# Patient Record
Sex: MALE | Race: BLACK OR AFRICAN AMERICAN | NOT HISPANIC OR LATINO | Employment: FULL TIME | RURAL
[De-identification: names, ages, dates, MRNs, and addresses within clinical notes are randomized per-mention and may not be internally consistent; named-entity substitution may affect disease eponyms.]

---
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
## 2022-06-02 ENCOUNTER — OFFICE VISIT (OUTPATIENT)
Dept: FAMILY MEDICINE | Facility: CLINIC | Age: 22
End: 2022-06-02

## 2022-06-02 VITALS
WEIGHT: 315 LBS | HEIGHT: 67 IN | DIASTOLIC BLOOD PRESSURE: 78 MMHG | BODY MASS INDEX: 49.44 KG/M2 | TEMPERATURE: 99 F | RESPIRATION RATE: 16 BRPM | SYSTOLIC BLOOD PRESSURE: 121 MMHG | OXYGEN SATURATION: 99 % | HEART RATE: 72 BPM

## 2022-06-02 DIAGNOSIS — R22.2 CHEST WALL MASS: Primary | ICD-10-CM

## 2022-06-02 DIAGNOSIS — L73.2 HIDRADENITIS SUPPURATIVA: ICD-10-CM

## 2022-06-02 LAB
ALBUMIN SERPL BCP-MCNC: 3.5 G/DL (ref 3.5–5)
ALBUMIN/GLOB SERPL: 0.9 {RATIO}
ALP SERPL-CCNC: 113 U/L (ref 45–115)
ALT SERPL W P-5'-P-CCNC: 29 U/L (ref 16–61)
ANION GAP SERPL CALCULATED.3IONS-SCNC: 10 MMOL/L (ref 7–16)
AST SERPL W P-5'-P-CCNC: 13 U/L (ref 15–37)
BASOPHILS # BLD AUTO: 0.06 K/UL (ref 0–0.2)
BASOPHILS NFR BLD AUTO: 0.4 % (ref 0–1)
BILIRUB SERPL-MCNC: 0.8 MG/DL (ref 0–1.2)
BUN SERPL-MCNC: 12 MG/DL (ref 7–18)
BUN/CREAT SERPL: 12 (ref 6–20)
CALCIUM SERPL-MCNC: 9.2 MG/DL (ref 8.5–10.1)
CHLORIDE SERPL-SCNC: 104 MMOL/L (ref 98–107)
CO2 SERPL-SCNC: 29 MMOL/L (ref 21–32)
CREAT SERPL-MCNC: 0.97 MG/DL (ref 0.7–1.3)
DIFFERENTIAL METHOD BLD: ABNORMAL
EOSINOPHIL # BLD AUTO: 0.14 K/UL (ref 0–0.5)
EOSINOPHIL NFR BLD AUTO: 1 % (ref 1–4)
ERYTHROCYTE [DISTWIDTH] IN BLOOD BY AUTOMATED COUNT: 11.9 % (ref 11.5–14.5)
GLOBULIN SER-MCNC: 4.1 G/DL (ref 2–4)
GLUCOSE SERPL-MCNC: 86 MG/DL (ref 74–106)
HCT VFR BLD AUTO: 44.1 % (ref 40–54)
HGB BLD-MCNC: 14.3 G/DL (ref 13.5–18)
IMM GRANULOCYTES # BLD AUTO: 0.05 K/UL (ref 0–0.04)
IMM GRANULOCYTES NFR BLD: 0.4 % (ref 0–0.4)
LYMPHOCYTES # BLD AUTO: 1.92 K/UL (ref 1–4.8)
LYMPHOCYTES NFR BLD AUTO: 13.9 % (ref 27–41)
MCH RBC QN AUTO: 30.3 PG (ref 27–31)
MCHC RBC AUTO-ENTMCNC: 32.4 G/DL (ref 32–36)
MCV RBC AUTO: 93.4 FL (ref 80–96)
MONOCYTES # BLD AUTO: 1.25 K/UL (ref 0–0.8)
MONOCYTES NFR BLD AUTO: 9.1 % (ref 2–6)
MPC BLD CALC-MCNC: 10.3 FL (ref 9.4–12.4)
NEUTROPHILS # BLD AUTO: 10.37 K/UL (ref 1.8–7.7)
NEUTROPHILS NFR BLD AUTO: 75.2 % (ref 53–65)
NRBC # BLD AUTO: 0 X10E3/UL
NRBC, AUTO (.00): 0 %
PLATELET # BLD AUTO: 341 K/UL (ref 150–400)
POTASSIUM SERPL-SCNC: 4.6 MMOL/L (ref 3.5–5.1)
PROT SERPL-MCNC: 7.6 G/DL (ref 6.4–8.2)
RBC # BLD AUTO: 4.72 M/UL (ref 4.6–6.2)
SODIUM SERPL-SCNC: 138 MMOL/L (ref 136–145)
WBC # BLD AUTO: 13.79 K/UL (ref 4.5–11)

## 2022-06-02 PROCEDURE — 85025 COMPLETE CBC W/AUTO DIFF WBC: CPT | Mod: ,,, | Performed by: CLINICAL MEDICAL LABORATORY

## 2022-06-02 PROCEDURE — 80053 COMPREHENSIVE METABOLIC PANEL: ICD-10-PCS | Mod: ,,, | Performed by: CLINICAL MEDICAL LABORATORY

## 2022-06-02 PROCEDURE — 99214 OFFICE O/P EST MOD 30 MIN: CPT | Mod: ,,, | Performed by: FAMILY MEDICINE

## 2022-06-02 PROCEDURE — 99214 PR OFFICE/OUTPT VISIT, EST, LEVL IV, 30-39 MIN: ICD-10-PCS | Mod: ,,, | Performed by: FAMILY MEDICINE

## 2022-06-02 PROCEDURE — 80053 COMPREHEN METABOLIC PANEL: CPT | Mod: ,,, | Performed by: CLINICAL MEDICAL LABORATORY

## 2022-06-02 PROCEDURE — 85025 CBC WITH DIFFERENTIAL: ICD-10-PCS | Mod: ,,, | Performed by: CLINICAL MEDICAL LABORATORY

## 2022-06-02 RX ORDER — DOXYCYCLINE 100 MG/1
100 CAPSULE ORAL 2 TIMES DAILY
Qty: 14 CAPSULE | Refills: 0 | Status: SHIPPED | OUTPATIENT
Start: 2022-06-02 | End: 2022-06-09

## 2022-06-02 NOTE — PROGRESS NOTES
"   Antoni Victor DO   [facility]  [address]  [place]     PATIENT NAME: Cally Bearden  : 2000  DATE: 22  MRN: [id number]      Billing Provider: Antoni Victor DO  Level of Service:   Patient PCP Information     Provider PCP Type    Antoni Victor DO General          Reason for Visit / Chief Complaint: Abscess (Right later chest- no open sore/Left Axillary-open sore with drainage-pt says he isnt sure how long they have been there." a while")       Update PCP  Update Chief Complaint         History of Present Illness / Problem Focused Workflow     Cally Bearden presents to the clinic for abscess formation under BL axilla that has been present for about 2 week and become painful in the last week. Also reports abscess formation on RIGHT chest wall that is painful. No other complaints. No past medical. Past surgical of BL knee scope. NKDA. Denies tobacco, etoh or drug use      Review of Systems     Review of Systems   Constitutional: Negative.    Eyes: Negative.    Respiratory: Negative.    Cardiovascular: Negative.    Gastrointestinal: Negative.    All other systems reviewed and are negative.       Medical / Social / Family History   History reviewed. No pertinent past medical history.    Past Surgical History:   Procedure Laterality Date    bilateral knee surgery      TONSILLECTOMY         Social History  MrChana  reports that he has never smoked. He has never used smokeless tobacco. He reports that he does not drink alcohol and does not use drugs.    Family History  MrChana's family history is not on file.    Medications and Allergies     Medications  No outpatient medications have been marked as taking for the 22 encounter (Office Visit) with Antoni Victor DO.       Allergies  Review of patient's allergies indicates:   Allergen Reactions    Shellfish containing products Swelling       Physical Examination     Vitals:    22 1006   BP: 121/78   BP Location: " "Right arm   Patient Position: Sitting   Pulse: 72   Resp: 16   Temp: 98.6 °F (37 °C)   TempSrc: Oral   SpO2: 99%   Weight: (!) 152.7 kg (336 lb 9.6 oz)   Height: 5' 7" (1.702 m)      Physical Exam  Vitals and nursing note reviewed.   Constitutional:       General: He is not in acute distress.     Appearance: Normal appearance. He is normal weight. He is not ill-appearing, toxic-appearing or diaphoretic.   Neck:      Vascular: No carotid bruit.   Cardiovascular:      Rate and Rhythm: Normal rate and regular rhythm.      Pulses: Normal pulses.      Heart sounds: Normal heart sounds.   Pulmonary:      Effort: Pulmonary effort is normal.      Breath sounds: Normal breath sounds.   Lymphadenopathy:      Cervical: No cervical adenopathy.   Skin:     Comments: hidranitis supprativa BL axilla LEFT worse than RIGHT. LEFT side has open areas that appear to be tunneling. Scant dsicahrge. RIGHT is tender without areas of fluctuance or drainage.     4cm tender elongated mass on RIGHT chest wall. No obvious drainage, cellulitis or wound. Suspect lipoma   Neurological:      Mental Status: He is alert.          Assessment and Plan (including Health Maintenance)      Problem List  Smart Sets  Document Outside HM   :    Plan:   Refer to surgery  US chest wall mass  CBC  CMP  Pt will use OTC tylenol/ ibuprofen. Declined narcotic pain medication.   doxycyline      Health Maintenance Due   Topic Date Due    Hepatitis C Screening  Never done    HPV Vaccines (1 - Male 2-dose series) Never done    TETANUS VACCINE  Never done       Problem List Items Addressed This Visit    None       There are no diagnoses linked to this encounter.   Health Maintenance Topics with due status: Not Due       Topic Last Completion Date    Influenza Vaccine Not Due       Procedures     No future appointments.     No follow-ups on file.       Signature:  Antoni Victor DO    Date of encounter: 6/2/22    Education Documentation  No documentation " found.  Education Comments  No comments found.       There are no Patient Instructions on file for this visit.

## 2022-06-08 ENCOUNTER — HOSPITAL ENCOUNTER (EMERGENCY)
Facility: HOSPITAL | Age: 22
Discharge: LEFT BEFORE DISCHARGE | End: 2022-06-08

## 2022-06-08 VITALS
RESPIRATION RATE: 18 BRPM | WEIGHT: 315 LBS | BODY MASS INDEX: 46.65 KG/M2 | OXYGEN SATURATION: 98 % | DIASTOLIC BLOOD PRESSURE: 67 MMHG | TEMPERATURE: 98 F | HEART RATE: 88 BPM | SYSTOLIC BLOOD PRESSURE: 132 MMHG | HEIGHT: 69 IN

## 2022-06-08 PROCEDURE — 99900041 HC LEFT WITHOUT BEING SEEN- EMERGENCY

## 2022-10-25 ENCOUNTER — OFFICE VISIT (OUTPATIENT)
Dept: FAMILY MEDICINE | Facility: CLINIC | Age: 22
End: 2022-10-25

## 2022-10-25 VITALS
HEIGHT: 69 IN | TEMPERATURE: 99 F | SYSTOLIC BLOOD PRESSURE: 128 MMHG | OXYGEN SATURATION: 99 % | BODY MASS INDEX: 46.65 KG/M2 | HEART RATE: 67 BPM | WEIGHT: 315 LBS | RESPIRATION RATE: 18 BRPM | DIASTOLIC BLOOD PRESSURE: 74 MMHG

## 2022-10-25 DIAGNOSIS — L02.91 ABSCESS: Primary | ICD-10-CM

## 2022-10-25 LAB
BASOPHILS # BLD AUTO: 0.05 K/UL (ref 0–0.2)
BASOPHILS NFR BLD AUTO: 0.3 % (ref 0–1)
DIFFERENTIAL METHOD BLD: ABNORMAL
EOSINOPHIL # BLD AUTO: 0.15 K/UL (ref 0–0.5)
EOSINOPHIL NFR BLD AUTO: 1 % (ref 1–4)
ERYTHROCYTE [DISTWIDTH] IN BLOOD BY AUTOMATED COUNT: 12.6 % (ref 11.5–14.5)
HCT VFR BLD AUTO: 40.2 % (ref 40–54)
HGB BLD-MCNC: 12.7 G/DL (ref 13.5–18)
IMM GRANULOCYTES # BLD AUTO: 0.06 K/UL (ref 0–0.04)
IMM GRANULOCYTES NFR BLD: 0.4 % (ref 0–0.4)
LYMPHOCYTES # BLD AUTO: 2.09 K/UL (ref 1–4.8)
LYMPHOCYTES NFR BLD AUTO: 13.6 % (ref 27–41)
MCH RBC QN AUTO: 28.7 PG (ref 27–31)
MCHC RBC AUTO-ENTMCNC: 31.6 G/DL (ref 32–36)
MCV RBC AUTO: 90.7 FL (ref 80–96)
MONOCYTES # BLD AUTO: 1.24 K/UL (ref 0–0.8)
MONOCYTES NFR BLD AUTO: 8.1 % (ref 2–6)
MPC BLD CALC-MCNC: 10.2 FL (ref 9.4–12.4)
NEUTROPHILS # BLD AUTO: 11.77 K/UL (ref 1.8–7.7)
NEUTROPHILS NFR BLD AUTO: 76.6 % (ref 53–65)
NRBC # BLD AUTO: 0 X10E3/UL
NRBC, AUTO (.00): 0 %
PLATELET # BLD AUTO: 419 K/UL (ref 150–400)
RBC # BLD AUTO: 4.43 M/UL (ref 4.6–6.2)
WBC # BLD AUTO: 15.36 K/UL (ref 4.5–11)

## 2022-10-25 PROCEDURE — 87070 CULTURE OTHR SPECIMN AEROBIC: CPT | Mod: ,,, | Performed by: CLINICAL MEDICAL LABORATORY

## 2022-10-25 PROCEDURE — 87186 CULTURE, WOUND: ICD-10-PCS | Mod: XU,91,, | Performed by: CLINICAL MEDICAL LABORATORY

## 2022-10-25 PROCEDURE — 96372 THER/PROPH/DIAG INJ SC/IM: CPT | Mod: ,,, | Performed by: NURSE PRACTITIONER

## 2022-10-25 PROCEDURE — 99214 PR OFFICE/OUTPT VISIT, EST, LEVL IV, 30-39 MIN: ICD-10-PCS | Mod: 25,,, | Performed by: NURSE PRACTITIONER

## 2022-10-25 PROCEDURE — 85025 CBC WITH DIFFERENTIAL: ICD-10-PCS | Mod: ,,, | Performed by: CLINICAL MEDICAL LABORATORY

## 2022-10-25 PROCEDURE — 99214 OFFICE O/P EST MOD 30 MIN: CPT | Mod: 25,,, | Performed by: NURSE PRACTITIONER

## 2022-10-25 PROCEDURE — 85025 COMPLETE CBC W/AUTO DIFF WBC: CPT | Mod: ,,, | Performed by: CLINICAL MEDICAL LABORATORY

## 2022-10-25 PROCEDURE — 87070 CULTURE, WOUND: ICD-10-PCS | Mod: ,,, | Performed by: CLINICAL MEDICAL LABORATORY

## 2022-10-25 PROCEDURE — 87077 CULTURE AEROBIC IDENTIFY: CPT | Mod: ,,, | Performed by: CLINICAL MEDICAL LABORATORY

## 2022-10-25 PROCEDURE — 87077 CULTURE, WOUND: ICD-10-PCS | Mod: ,,, | Performed by: CLINICAL MEDICAL LABORATORY

## 2022-10-25 PROCEDURE — 96372 PR INJECTION,THERAP/PROPH/DIAG2ST, IM OR SUBCUT: ICD-10-PCS | Mod: ,,, | Performed by: NURSE PRACTITIONER

## 2022-10-25 PROCEDURE — 87186 SC STD MICRODIL/AGAR DIL: CPT | Mod: ,,, | Performed by: CLINICAL MEDICAL LABORATORY

## 2022-10-25 RX ORDER — CEFTRIAXONE 1 G/1
1 INJECTION, POWDER, FOR SOLUTION INTRAMUSCULAR; INTRAVENOUS
Status: COMPLETED | OUTPATIENT
Start: 2022-10-25 | End: 2022-10-25

## 2022-10-25 RX ORDER — MUPIROCIN 20 MG/G
OINTMENT TOPICAL 2 TIMES DAILY
Qty: 30 G | Refills: 3 | Status: SHIPPED | OUTPATIENT
Start: 2022-10-25 | End: 2022-11-07 | Stop reason: SDUPTHER

## 2022-10-25 RX ORDER — CLINDAMYCIN HYDROCHLORIDE 150 MG/1
150 CAPSULE ORAL EVERY 6 HOURS
Qty: 40 CAPSULE | Refills: 0 | Status: SHIPPED | OUTPATIENT
Start: 2022-10-25 | End: 2022-11-28 | Stop reason: ALTCHOICE

## 2022-10-25 RX ADMIN — CEFTRIAXONE 1 G: 1 INJECTION, POWDER, FOR SOLUTION INTRAMUSCULAR; INTRAVENOUS at 09:10

## 2022-10-25 NOTE — LETTER
October 25, 2022      [facility] [address] [place] [address]  Phone: [phone]  Fax: [phone]       Patient: Cally Bearden   YOB: 2000  Date of Visit: 10/25/2022    To Whom It May Concern:    Katelin Bearden  was at [facility] on 10/25/2022 accompanied by mother, Elisa Agee. The patient may return to work/school on 10/26/22 with no restrictions. If you have any questions or concerns, or if I can be of further assistance, please do not hesitate to contact me.    Sincerely,    SHYLA Persaud RN

## 2022-10-25 NOTE — PATIENT INSTRUCTIONS
Clean areas with soap and water, meds as ordered, keep covered with sterile dressings, return to clinic thur and Friday for repeat rocephin, eval on Friday, Monday will have kathy, keep all scheduled kathy.       He was informed that he may have upset stomach with antibiotics, try to tolerate if possible, take with food

## 2022-10-25 NOTE — PROGRESS NOTES
Diana Brownlee NP   [facility]  [address]  [place]     PATIENT NAME: Cally Bearden  : 2000  DATE: 10/25/22  MRN: [id number]      Billing Provider: Diana Brownlee NP  Level of Service:   Patient PCP Information       Provider PCP Type    Diana Brownlee NP General            Reason for Visit / Chief Complaint: Abscess (UNDER BILATERAL ARMS. This has been ongoing since at least . Currently has no insurance. [employer] BCBS will be active in January. )       Update PCP  Update Chief Complaint         History of Present Illness / Problem Focused Workflow     Cally Bearden presents to the clinic here with multiple abscess, draining under arms on upper trunk, lat area. Stated that has been going on since 2022      Review of Systems     Review of Systems   Constitutional:  Negative for chills, fatigue and fever.   HENT:  Negative for nasal congestion, ear pain, facial swelling, hearing loss, mouth dryness, mouth sores, postnasal drip, rhinorrhea, sinus pressure/congestion and goiter.    Eyes:  Negative for discharge and itching.   Respiratory:  Negative for cough, shortness of breath and wheezing.    Cardiovascular:  Negative for chest pain and leg swelling.   Gastrointestinal:  Negative for abdominal pain, change in bowel habit and change in bowel habit.   Genitourinary:  Negative for difficulty urinating, dysuria, enuresis, frequency, hematuria and urgency.   Integumentary:         Multiple draining abscesses under fariba arms and lat upper trunk. Open areas on left upper arm kathy 3 cm in diameter, dressing were intact, sm amt bright red blood on areas   Neurological:  Negative for dizziness, vertigo, syncope, weakness and headaches.   Psychiatric/Behavioral:  Negative for decreased concentration.    All other systems reviewed and are negative.     Medical / Social / Family History   No past medical history on file.    Past Surgical History:   Procedure Laterality Date    bilateral knee  "surgery      TONSILLECTOMY         Social History    reports that he has never smoked. He has never used smokeless tobacco. He reports that he does not drink alcohol and does not use drugs.    Family History  's family history is not on file.    Medications and Allergies     Medications  No outpatient medications have been marked as taking for the 10/25/22 encounter (Office Visit) with Diana Brownlee NP.     Current Facility-Administered Medications for the 10/25/22 encounter (Office Visit) with Diana Brownlee NP   Medication Dose Route Frequency Provider Last Rate Last Admin    cefTRIAXone injection 1 g  1 g Intramuscular 1 time in Clinic/HOD Diana Brownlee NP           Allergies  Review of patient's allergies indicates:   Allergen Reactions    Shellfish containing products Swelling       Physical Examination     Vitals:    10/25/22 0820   BP: 128/74   BP Location: Left arm   Patient Position: Sitting   Pulse: 67   Resp: 18   Temp: 99 °F (37.2 °C)   TempSrc: Oral   SpO2: 99%   Weight: (!) 146.7 kg (323 lb 6.4 oz)   Height: 5' 9" (1.753 m)      Physical Exam  Constitutional:       Appearance: Normal appearance. He is obese.   HENT:      Head: Normocephalic.      Right Ear: Tympanic membrane, ear canal and external ear normal.      Left Ear: Tympanic membrane, ear canal and external ear normal.      Nose: Nose normal.      Mouth/Throat:      Mouth: Mucous membranes are moist.      Pharynx: Oropharynx is clear.   Eyes:      Extraocular Movements: Extraocular movements intact.      Conjunctiva/sclera: Conjunctivae normal.      Pupils: Pupils are equal, round, and reactive to light.   Cardiovascular:      Rate and Rhythm: Normal rate and regular rhythm.      Pulses: Normal pulses.      Heart sounds: Normal heart sounds.   Pulmonary:      Effort: Pulmonary effort is normal.      Breath sounds: Normal breath sounds.   Abdominal:      General: Bowel sounds are normal.      Palpations: Abdomen is soft. "   Musculoskeletal:         General: Normal range of motion.   Skin:     General: Skin is warm and dry.      Capillary Refill: Capillary refill takes less than 2 seconds.      Comments: Multiple draining abscesses under fariba arms and lat upper trunk. Open areas on left upper arm kathy 3 cm in diameter, dressing were intact, sm amt bright red blood on areas,  Culture obtained and sent to path  Areas cleaned with betadine/saline, polysporin and telfa with 4x4 dressings applied   Neurological:      General: No focal deficit present.      Mental Status: He is alert and oriented to person, place, and time.   Psychiatric:         Mood and Affect: Mood normal.         Behavior: Behavior normal.        Assessment and Plan (including Health Maintenance)      Problem List  Smart Sets  Document Outside HM   :    Plan: Clean areas with soap and water, meds as ordered, keep covered with sterile dressings, return to clinic thur and Friday for repeat rocephin, eval on Friday, Monday will have kathy, keep all scheduled kathy    Abscess  -     Culture, Wound; Future; Expected date: 10/25/2022  -     CBC Auto Differential; Future; Expected date: 10/25/2022    Other orders  -     cefTRIAXone injection 1 g  -     clindamycin (CLEOCIN) 150 MG capsule; Take 1 capsule (150 mg total) by mouth every 6 (six) hours.  Dispense: 40 capsule; Refill: 0  -     mupirocin (BACTROBAN) 2 % ointment; Apply topically 2 (two) times daily.  Dispense: 30 g; Refill: 3            Health Maintenance Due   Topic Date Due    Hepatitis C Screening  Never done    COVID-19 Vaccine (1) Never done    HPV Vaccines (1 - Male 2-dose series) Never done    TETANUS VACCINE  Never done    Influenza Vaccine (1) Never done       Problem List Items Addressed This Visit    None  Visit Diagnoses       Abscess    -  Primary    Relevant Orders    Culture, Wound    CBC Auto Differential              The patient has no Health Maintenance topics of status Not Due    Procedures     No  future appointments.     Follow up for thur and friday for rocephin 1 gm im and then monday for eval .       Signature:  Diana Brownlee NP    Date of encounter: 10/25/22

## 2022-10-27 ENCOUNTER — OFFICE VISIT (OUTPATIENT)
Dept: SURGERY | Facility: CLINIC | Age: 22
End: 2022-10-27
Attending: SURGERY

## 2022-10-27 ENCOUNTER — OFFICE VISIT (OUTPATIENT)
Dept: FAMILY MEDICINE | Facility: CLINIC | Age: 22
End: 2022-10-27

## 2022-10-27 VITALS
HEART RATE: 88 BPM | HEIGHT: 69 IN | RESPIRATION RATE: 18 BRPM | WEIGHT: 315 LBS | DIASTOLIC BLOOD PRESSURE: 68 MMHG | BODY MASS INDEX: 46.65 KG/M2 | SYSTOLIC BLOOD PRESSURE: 138 MMHG | TEMPERATURE: 99 F | OXYGEN SATURATION: 99 %

## 2022-10-27 VITALS — DIASTOLIC BLOOD PRESSURE: 84 MMHG | BODY MASS INDEX: 47.76 KG/M2 | HEIGHT: 69 IN | SYSTOLIC BLOOD PRESSURE: 161 MMHG

## 2022-10-27 DIAGNOSIS — L73.2 HIDRADENITIS SUPPURATIVA: ICD-10-CM

## 2022-10-27 DIAGNOSIS — L02.91 ABSCESS: Primary | ICD-10-CM

## 2022-10-27 LAB
BASOPHILS # BLD AUTO: 0.02 K/UL (ref 0–0.2)
BASOPHILS NFR BLD AUTO: 0.2 % (ref 0–1)
DIFFERENTIAL METHOD BLD: ABNORMAL
EOSINOPHIL # BLD AUTO: 0.1 K/UL (ref 0–0.5)
EOSINOPHIL NFR BLD AUTO: 0.9 % (ref 1–4)
ERYTHROCYTE [DISTWIDTH] IN BLOOD BY AUTOMATED COUNT: 12.8 % (ref 11.5–14.5)
HCT VFR BLD AUTO: 38.9 % (ref 40–54)
HGB BLD-MCNC: 12.4 G/DL (ref 13.5–18)
LYMPHOCYTES # BLD AUTO: 1.39 K/UL (ref 1–4.8)
LYMPHOCYTES NFR BLD AUTO: 12 % (ref 27–41)
MCH RBC QN AUTO: 28.9 PG (ref 27–31)
MCHC RBC AUTO-ENTMCNC: 31.9 G/DL (ref 32–36)
MCV RBC AUTO: 90.7 FL (ref 80–96)
MONOCYTES # BLD AUTO: 0.38 K/UL (ref 0–0.8)
MONOCYTES NFR BLD AUTO: 3.3 % (ref 2–6)
MPC BLD CALC-MCNC: 9.5 FL (ref 9.4–12.4)
NEUTROPHILS # BLD AUTO: 9.67 K/UL (ref 1.8–7.7)
NEUTROPHILS NFR BLD AUTO: 83.6 % (ref 53–65)
PLATELET # BLD AUTO: 374 K/UL (ref 150–400)
RBC # BLD AUTO: 4.29 M/UL (ref 4.6–6.2)
WBC # BLD AUTO: 11.56 K/UL (ref 4.5–11)

## 2022-10-27 PROCEDURE — 85025 COMPLETE CBC W/AUTO DIFF WBC: CPT | Performed by: NURSE PRACTITIONER

## 2022-10-27 PROCEDURE — 99213 OFFICE O/P EST LOW 20 MIN: CPT | Mod: 25,,, | Performed by: NURSE PRACTITIONER

## 2022-10-27 PROCEDURE — 96372 THER/PROPH/DIAG INJ SC/IM: CPT | Mod: ,,, | Performed by: NURSE PRACTITIONER

## 2022-10-27 PROCEDURE — 96372 PR INJECTION,THERAP/PROPH/DIAG2ST, IM OR SUBCUT: ICD-10-PCS | Mod: ,,, | Performed by: NURSE PRACTITIONER

## 2022-10-27 PROCEDURE — 99214 OFFICE O/P EST MOD 30 MIN: CPT | Mod: PBBFAC | Performed by: SURGERY

## 2022-10-27 PROCEDURE — 99203 PR OFFICE/OUTPT VISIT, NEW, LEVL III, 30-44 MIN: ICD-10-PCS | Mod: S$PBB,,, | Performed by: SURGERY

## 2022-10-27 PROCEDURE — 99213 PR OFFICE/OUTPT VISIT, EST, LEVL III, 20-29 MIN: ICD-10-PCS | Mod: 25,,, | Performed by: NURSE PRACTITIONER

## 2022-10-27 PROCEDURE — 99203 OFFICE O/P NEW LOW 30 MIN: CPT | Mod: S$PBB,,, | Performed by: SURGERY

## 2022-10-27 RX ORDER — SULFAMETHOXAZOLE AND TRIMETHOPRIM 800; 160 MG/1; MG/1
1 TABLET ORAL 2 TIMES DAILY
Qty: 20 TABLET | Refills: 0 | Status: SHIPPED | OUTPATIENT
Start: 2022-10-27 | End: 2022-11-23

## 2022-10-27 RX ORDER — CEFTRIAXONE 1 G/1
1 INJECTION, POWDER, FOR SOLUTION INTRAMUSCULAR; INTRAVENOUS
Status: COMPLETED | OUTPATIENT
Start: 2022-10-27 | End: 2022-10-27

## 2022-10-27 RX ADMIN — CEFTRIAXONE 1 G: 1 INJECTION, POWDER, FOR SOLUTION INTRAMUSCULAR; INTRAVENOUS at 08:10

## 2022-10-27 NOTE — PROGRESS NOTES
Diana Brownlee NP   [facility]  [address]  [place]     PATIENT NAME: Cally Bearden  : 2000  DATE: 10/27/22  MRN: [id number]      Billing Provider: Diana Brownlee NP  Level of Service:   Patient PCP Information       Provider PCP Type    Diana Brownlee NP General            Reason for Visit / Chief Complaint: Follow-up (Hidradenitis suppurativa- bilateral axillary )       Update PCP  Update Chief Complaint         History of Present Illness / Problem Focused Workflow     Cally Bearden presents to the clinic here for f/u hidradenitis suppurativa, fariba axillary region and upper trunk, areas are much improved, cbc improved, will refer to surgeon for eval.       Review of Systems     Review of Systems   Constitutional:  Negative for chills, fatigue and fever.   HENT:  Negative for nasal congestion, ear pain, facial swelling, hearing loss, mouth dryness, mouth sores, postnasal drip, rhinorrhea, sinus pressure/congestion and goiter.    Eyes:  Negative for discharge and itching.   Respiratory:  Negative for cough, shortness of breath and wheezing.    Cardiovascular:  Negative for chest pain and leg swelling.   Gastrointestinal:  Negative for abdominal pain, change in bowel habit and change in bowel habit.   Genitourinary:  Negative for difficulty urinating, dysuria, enuresis, frequency, hematuria and urgency.   Integumentary:         Multiple abscesses under fariba arms and upper trunk   Neurological:  Negative for dizziness, vertigo, syncope, weakness and headaches.   Psychiatric/Behavioral:  Negative for decreased concentration.       Medical / Social / Family History   No past medical history on file.    Past Surgical History:   Procedure Laterality Date    bilateral knee surgery      TONSILLECTOMY         Social History    reports that he has never smoked. He has never used smokeless tobacco. He reports that he does not drink alcohol and does not use drugs.    Family History  Mr.'s family  "history is not on file.    Medications and Allergies     Medications  No outpatient medications have been marked as taking for the 10/27/22 encounter (Office Visit) with Diana Brownlee NP.     Current Facility-Administered Medications for the 10/27/22 encounter (Office Visit) with Diana Brownlee NP   Medication Dose Route Frequency Provider Last Rate Last Admin    [COMPLETED] cefTRIAXone injection 1 g  1 g Intramuscular 1 time in Clinic/HOD Diana Brownlee NP   1 g at 10/27/22 0852       Allergies  Review of patient's allergies indicates:   Allergen Reactions    Shellfish containing products Swelling       Physical Examination     Vitals:    10/27/22 0845   BP: 138/68   BP Location: Left arm   Patient Position: Sitting   Pulse: 88   Resp: 18   Temp: 99.4 °F (37.4 °C)   TempSrc: Oral   SpO2: 99%   Weight: (!) 146.7 kg (323 lb 6.6 oz)   Height: 5' 9" (1.753 m)      Physical Exam  Vitals and nursing note reviewed.   Constitutional:       Appearance: Normal appearance.   HENT:      Head: Normocephalic.      Right Ear: Tympanic membrane, ear canal and external ear normal.      Left Ear: Tympanic membrane, ear canal and external ear normal.      Nose: Nose normal.      Mouth/Throat:      Mouth: Mucous membranes are moist.      Pharynx: Oropharynx is clear.   Eyes:      Extraocular Movements: Extraocular movements intact.      Conjunctiva/sclera: Conjunctivae normal.      Pupils: Pupils are equal, round, and reactive to light.   Cardiovascular:      Rate and Rhythm: Normal rate and regular rhythm.      Pulses: Normal pulses.      Heart sounds: Normal heart sounds.   Pulmonary:      Effort: Pulmonary effort is normal.      Breath sounds: Normal breath sounds.   Abdominal:      General: Bowel sounds are normal.      Palpations: Abdomen is soft.   Musculoskeletal:         General: Normal range of motion.      Cervical back: Normal range of motion and neck supple.   Skin:     General: Skin is warm and dry.      Capillary " Refill: Capillary refill takes less than 2 seconds.      Comments: Multiple abscesses fariba axillary and right upper arm and trunk. Less drainage today, much improvement noted   Neurological:      General: No focal deficit present.      Mental Status: He is alert and oriented to person, place, and time.   Psychiatric:         Mood and Affect: Mood normal.         Behavior: Behavior normal.        Assessment and Plan (including Health Maintenance)      Problem List  Smart Sets  Document Outside HM   :    Plan: to see dr Tobar at 300pm today, will change antibiotic to bactrim, stop clinidamycin if ok with dr tobar. Return to clinic tomorrow for eval and then Monday as scheduled unless dr tobar has different plan of care    Abscess  -     CBC Auto Differential; Future; Expected date: 10/27/2022  -     cefTRIAXone injection 1 g    Hidradenitis suppurativa  -     Ambulatory referral/consult to General Surgery; Future; Expected date: 11/03/2022    Other orders  -     sulfamethoxazole-trimethoprim 800-160mg (BACTRIM DS) 800-160 mg Tab; Take 1 tablet by mouth 2 (two) times daily. (Patient not taking: Reported on 10/27/2022)  Dispense: 20 tablet; Refill: 0          Health Maintenance Due   Topic Date Due    Hepatitis C Screening  Never done    COVID-19 Vaccine (1) Never done    HPV Vaccines (1 - Male 2-dose series) Never done    TETANUS VACCINE  Never done    Influenza Vaccine (1) Never done       Problem List Items Addressed This Visit          Derm    Hidradenitis suppurativa    Relevant Orders    Ambulatory referral/consult to General Surgery       ID    Abscess - Primary    Relevant Medications    cefTRIAXone injection 1 g (Completed)    Other Relevant Orders    CBC Auto Differential (Completed)         The patient has no Health Maintenance topics of status Not Due    Procedures     Future Appointments   Date Time Provider Department Center   10/31/2022  2:45 PM Diana Brownlee NP [facility]        No follow-ups on  file.       Signature:  Diana Brownlee NP    Date of encounter: 10/27/22

## 2022-10-27 NOTE — PROGRESS NOTES
Do not send in doxycycline, will send in bactrim ds and give anoterh rocephin today, check cbc today

## 2022-10-28 ENCOUNTER — CLINICAL SUPPORT (OUTPATIENT)
Dept: FAMILY MEDICINE | Facility: CLINIC | Age: 22
End: 2022-10-28

## 2022-10-28 DIAGNOSIS — L02.91 ABSCESS: Primary | ICD-10-CM

## 2022-10-28 DIAGNOSIS — L73.2 HIDRADENITIS SUPPURATIVA: ICD-10-CM

## 2022-10-28 LAB
MICROORGANISM SPEC CULT: ABNORMAL
MICROORGANISM SPEC CULT: ABNORMAL

## 2022-10-28 PROCEDURE — 96372 THER/PROPH/DIAG INJ SC/IM: CPT | Mod: ,,, | Performed by: NURSE PRACTITIONER

## 2022-10-28 PROCEDURE — 96372 PR INJECTION,THERAP/PROPH/DIAG2ST, IM OR SUBCUT: ICD-10-PCS | Mod: ,,, | Performed by: NURSE PRACTITIONER

## 2022-10-28 RX ORDER — CEFTRIAXONE 1 G/1
1 INJECTION, POWDER, FOR SOLUTION INTRAMUSCULAR; INTRAVENOUS
Status: COMPLETED | OUTPATIENT
Start: 2022-10-28 | End: 2022-10-28

## 2022-10-28 RX ADMIN — CEFTRIAXONE 1 G: 1 INJECTION, POWDER, FOR SOLUTION INTRAMUSCULAR; INTRAVENOUS at 10:10

## 2022-10-28 NOTE — PROGRESS NOTES
General Surgery History and Physical      Patient ID: Cally Bearden is a 21 y.o. male.    Chief Complaint: Other (Abcess under both arms since June of 2022)      HPI:  21-year-old male who for last couple of years has had bilateral axillary hidradenitis right side greater than left.  He has had multiple draining sinuses and tenderness.  No recent fever or chills.  His most recent flare-up has caused him to go was primary care doctor and he has not really done so well with antibiotics still with some soreness and drainage.  He patient is nondiabetic.  He has had these areas drain before and currently there draining some mild purulent areas.    Review of Systems   Constitutional:  Positive for chills and fatigue. Negative for activity change, appetite change and fever.   HENT:  Negative for trouble swallowing.    Respiratory:  Negative for cough and shortness of breath.    Cardiovascular:  Negative for chest pain and palpitations.   Gastrointestinal:  Negative for abdominal distention, abdominal pain, blood in stool, constipation and diarrhea.   Genitourinary:  Negative for flank pain.   Musculoskeletal:  Negative for neck pain and neck stiffness.   Neurological:  Negative for weakness.     Current Outpatient Medications   Medication Sig Dispense Refill    clindamycin (CLEOCIN) 150 MG capsule Take 1 capsule (150 mg total) by mouth every 6 (six) hours. 40 capsule 0    mupirocin (BACTROBAN) 2 % ointment Apply topically 2 (two) times daily. (Patient not taking: Reported on 10/27/2022) 30 g 3    sulfamethoxazole-trimethoprim 800-160mg (BACTRIM DS) 800-160 mg Tab Take 1 tablet by mouth 2 (two) times daily. (Patient not taking: Reported on 10/27/2022) 20 tablet 0     No current facility-administered medications for this visit.       Review of patient's allergies indicates:   Allergen Reactions    Shellfish containing products  Swelling       History reviewed. No pertinent past medical history.    Past Surgical History:   Procedure Laterality Date    bilateral knee surgery      TONSILLECTOMY         History reviewed. No pertinent family history.    Social History     Socioeconomic History    Marital status: Single   Tobacco Use    Smoking status: Never    Smokeless tobacco: Never   Substance and Sexual Activity    Alcohol use: Never    Drug use: Never    Sexual activity: Not Currently       Vitals:    10/27/22 1510   BP: (!) 161/84       Physical Exam  Constitutional:       General: He is not in acute distress.  HENT:      Head: Normocephalic.   Cardiovascular:      Rate and Rhythm: Normal rate and regular rhythm.      Pulses: Normal pulses.   Pulmonary:      Effort: Pulmonary effort is normal. No respiratory distress.      Breath sounds: Normal breath sounds.   Abdominal:      General: Abdomen is flat. There is no distension.      Palpations: Abdomen is soft.      Tenderness: There is no abdominal tenderness.   Musculoskeletal:         General: Normal range of motion.   Skin:     General: Skin is warm.      Findings: Lesion (Bilateral axilla with multiple openings draining some mild purulence regions.  Currently no pockets of induration or trapped pus) present.   Neurological:      General: No focal deficit present.      Mental Status: He is oriented to person, place, and time.       Assessment & Plan:    Hidradenitis suppurativa  -     Ambulatory referral/consult to General Surgery  -     Ambulatory referral/consult to Dermatology; Future; Expected date: 11/03/2022        Patient has not seen a dermatologist yet will refer the patient for possible immune suppression therapy and if patient fails that then will consider surgical intervention.  Risks and benefits explained.  They understand he does have a pocket that requires drainage would be happy to help them.  All questions were answered

## 2022-10-31 ENCOUNTER — OFFICE VISIT (OUTPATIENT)
Dept: FAMILY MEDICINE | Facility: CLINIC | Age: 22
End: 2022-10-31

## 2022-10-31 VITALS
HEIGHT: 69 IN | RESPIRATION RATE: 20 BRPM | HEART RATE: 94 BPM | SYSTOLIC BLOOD PRESSURE: 130 MMHG | DIASTOLIC BLOOD PRESSURE: 80 MMHG | TEMPERATURE: 99 F | WEIGHT: 315 LBS | OXYGEN SATURATION: 98 % | BODY MASS INDEX: 46.65 KG/M2

## 2022-10-31 DIAGNOSIS — L73.2 HIDRADENITIS SUPPURATIVA: Primary | ICD-10-CM

## 2022-10-31 DIAGNOSIS — L02.91 ABSCESS: ICD-10-CM

## 2022-10-31 PROCEDURE — 99213 PR OFFICE/OUTPT VISIT, EST, LEVL III, 20-29 MIN: ICD-10-PCS | Mod: 25,,, | Performed by: NURSE PRACTITIONER

## 2022-10-31 PROCEDURE — 99213 OFFICE O/P EST LOW 20 MIN: CPT | Mod: 25,,, | Performed by: NURSE PRACTITIONER

## 2022-10-31 PROCEDURE — 96372 THER/PROPH/DIAG INJ SC/IM: CPT | Mod: ,,, | Performed by: NURSE PRACTITIONER

## 2022-10-31 PROCEDURE — 96372 PR INJECTION,THERAP/PROPH/DIAG2ST, IM OR SUBCUT: ICD-10-PCS | Mod: ,,, | Performed by: NURSE PRACTITIONER

## 2022-10-31 RX ORDER — CEFTRIAXONE 1 G/1
1 INJECTION, POWDER, FOR SOLUTION INTRAMUSCULAR; INTRAVENOUS
Status: COMPLETED | OUTPATIENT
Start: 2022-10-31 | End: 2022-10-31

## 2022-10-31 RX ADMIN — CEFTRIAXONE 1 G: 1 INJECTION, POWDER, FOR SOLUTION INTRAMUSCULAR; INTRAVENOUS at 03:10

## 2022-10-31 NOTE — PROGRESS NOTES
Diana Brownlee NP   [facility]  [address]  [place]     PATIENT NAME: Cally Bearden  : 2000  DATE: 10/31/22  MRN: [id number]      Billing Provider: Diana Brownlee NP  Level of Service:   Patient PCP Information       Provider PCP Type    Diana Brownlee NP General            Reason for Visit / Chief Complaint: Follow-up (F/u abscess under both arms)       Update PCP  Update Chief Complaint         History of Present Illness / Problem Focused Workflow     Cally Bearden presents to the clinic here for f/u hidradenitis suppurativa, fariba axillary region and upper trunk, areas are much improved      Review of Systems     Review of Systems   Constitutional:  Negative for chills, fatigue and fever.   HENT:  Negative for nasal congestion, ear pain, facial swelling, hearing loss, mouth dryness, mouth sores, postnasal drip, rhinorrhea, sinus pressure/congestion and goiter.    Eyes:  Negative for discharge and itching.   Respiratory:  Negative for cough, shortness of breath and wheezing.    Cardiovascular:  Negative for chest pain and leg swelling.   Gastrointestinal:  Negative for abdominal pain, change in bowel habit and change in bowel habit.   Genitourinary:  Negative for difficulty urinating, dysuria, enuresis, frequency, hematuria and urgency.   Integumentary:         Multiple abscesses under fariba arms and upper trunk   Neurological:  Negative for dizziness, vertigo, syncope, weakness and headaches.   Psychiatric/Behavioral:  Negative for decreased concentration.       Medical / Social / Family History   No past medical history on file.    Past Surgical History:   Procedure Laterality Date    bilateral knee surgery      TONSILLECTOMY         Social History    reports that he has never smoked. He has never used smokeless tobacco. He reports that he does not drink alcohol and does not use drugs.    Family History  's family history is not on file.    Medications and Allergies  "    Medications  Outpatient Medications Marked as Taking for the 10/31/22 encounter (Office Visit) with Diana Brownlee NP   Medication Sig Dispense Refill    clindamycin (CLEOCIN) 150 MG capsule Take 1 capsule (150 mg total) by mouth every 6 (six) hours. 40 capsule 0    mupirocin (BACTROBAN) 2 % ointment Apply topically 2 (two) times daily. 30 g 3    sulfamethoxazole-trimethoprim 800-160mg (BACTRIM DS) 800-160 mg Tab Take 1 tablet by mouth 2 (two) times daily. 20 tablet 0     Current Facility-Administered Medications for the 10/31/22 encounter (Office Visit) with Diana Brownlee NP   Medication Dose Route Frequency Provider Last Rate Last Admin    [COMPLETED] cefTRIAXone injection 1 g  1 g Intramuscular 1 time in Clinic/HOD Diana Brownlee NP   1 g at 10/31/22 1535       Allergies  Review of patient's allergies indicates:   Allergen Reactions    Shellfish containing products Swelling       Physical Examination     Vitals:    10/31/22 1445   BP: 130/80   BP Location: Left arm   Patient Position: Sitting   BP Method: Large (Manual)   Pulse: 94   Resp: 20   Temp: 99.4 °F (37.4 °C)   TempSrc: Oral   SpO2: 98%   Weight: (!) 146.5 kg (323 lb)   Height: 5' 9.02" (1.753 m)      Physical Exam  Vitals and nursing note reviewed.   Constitutional:       Appearance: Normal appearance.   HENT:      Head: Normocephalic.      Right Ear: Tympanic membrane, ear canal and external ear normal.      Left Ear: Tympanic membrane, ear canal and external ear normal.      Nose: Nose normal.      Mouth/Throat:      Mouth: Mucous membranes are moist.      Pharynx: Oropharynx is clear.   Eyes:      Extraocular Movements: Extraocular movements intact.      Conjunctiva/sclera: Conjunctivae normal.      Pupils: Pupils are equal, round, and reactive to light.   Cardiovascular:      Rate and Rhythm: Normal rate and regular rhythm.      Pulses: Normal pulses.      Heart sounds: Normal heart sounds.   Pulmonary:      Effort: Pulmonary effort is " normal.      Breath sounds: Normal breath sounds.   Abdominal:      General: Bowel sounds are normal.      Palpations: Abdomen is soft.   Musculoskeletal:         General: Normal range of motion.      Cervical back: Normal range of motion and neck supple.   Skin:     General: Skin is warm and dry.      Capillary Refill: Capillary refill takes less than 2 seconds.      Comments: Multiple abscesses fariba axillary and right upper arm and trunk. Less drainage today, much improvement noted   Neurological:      General: No focal deficit present.      Mental Status: He is alert and oriented to person, place, and time.   Psychiatric:         Mood and Affect: Mood normal.         Behavior: Behavior normal.        Assessment and Plan (including Health Maintenance)      Problem List  Smart [employer]  Document Outside HM   :    Plan: to see dr tari middleton tomorrow at 100pm, return to [facility] next week for eval, cont all meds as ordered,     Abscess    Hidradenitis suppurativa    Other orders  -     cefTRIAXone injection 1 g          Health Maintenance Due   Topic Date Due    Hepatitis C Screening  Never done    COVID-19 Vaccine (1) Never done    HPV Vaccines (1 - Male 2-dose series) Never done    TETANUS VACCINE  Never done    Influenza Vaccine (1) Never done       Problem List Items Addressed This Visit          Derm    Hidradenitis suppurativa       ID    Abscess         The patient has no Health Maintenance topics of status Not Due    Procedures     Future Appointments   Date Time Provider Department Center   11/1/2022  1:00 PM Tari Brown MD [facility] [place]   11/7/2022  1:30 PM Diana Brownlee NP [facility]          Follow up in about 1 year (around 10/31/2023).       Signature:  Diana Brownlee NP    Date of encounter: 10/31/22

## 2022-11-01 ENCOUNTER — OFFICE VISIT (OUTPATIENT)
Dept: DERMATOLOGY | Facility: CLINIC | Age: 22
End: 2022-11-01

## 2022-11-01 DIAGNOSIS — L73.2 HIDRADENITIS SUPPURATIVA: Primary | ICD-10-CM

## 2022-11-01 DIAGNOSIS — Z79.899 HIGH RISK MEDICATION USE: ICD-10-CM

## 2022-11-01 PROBLEM — R22.2 CHEST WALL MASS: Status: RESOLVED | Noted: 2022-06-02 | Resolved: 2022-11-01

## 2022-11-01 PROBLEM — M25.572 LEFT ANKLE PAIN: Status: RESOLVED | Noted: 2021-09-29 | Resolved: 2022-11-01

## 2022-11-01 PROBLEM — S93.402A SPRAIN OF LEFT ANKLE: Status: RESOLVED | Noted: 2021-09-29 | Resolved: 2022-11-01

## 2022-11-01 PROCEDURE — 99204 PR OFFICE/OUTPT VISIT, NEW, LEVL IV, 45-59 MIN: ICD-10-PCS | Mod: ,,, | Performed by: DERMATOLOGY

## 2022-11-01 PROCEDURE — 87186 CULTURE, WOUND: ICD-10-PCS | Mod: ,,, | Performed by: CLINICAL MEDICAL LABORATORY

## 2022-11-01 PROCEDURE — 87070 CULTURE, WOUND: ICD-10-PCS | Mod: ,,, | Performed by: CLINICAL MEDICAL LABORATORY

## 2022-11-01 PROCEDURE — 87077 CULTURE AEROBIC IDENTIFY: CPT | Mod: ,,, | Performed by: CLINICAL MEDICAL LABORATORY

## 2022-11-01 PROCEDURE — 99204 OFFICE O/P NEW MOD 45 MIN: CPT | Mod: ,,, | Performed by: DERMATOLOGY

## 2022-11-01 PROCEDURE — 87070 CULTURE OTHR SPECIMN AEROBIC: CPT | Mod: ,,, | Performed by: CLINICAL MEDICAL LABORATORY

## 2022-11-01 PROCEDURE — 87077 CULTURE, WOUND: ICD-10-PCS | Mod: ,,, | Performed by: CLINICAL MEDICAL LABORATORY

## 2022-11-01 PROCEDURE — 87186 SC STD MICRODIL/AGAR DIL: CPT | Mod: ,,, | Performed by: CLINICAL MEDICAL LABORATORY

## 2022-11-01 NOTE — PROGRESS NOTES
[facility]   Tari Brown MD    Patient Name: Cally Bearden  Patient YOB: 2000   Date of Service: 11/1/22    CC: [place]    HPI: Cally Bearden is a 21 y.o. male here today for Risen's, located on the Bilateral under arms. Risen's has been present for 4 months.  Previous treatments include clindamycin, mupirocin, bactrim and Rocephin.      History reviewed. No pertinent past medical history.  Past Surgical History:   Procedure Laterality Date    bilateral knee surgery      TONSILLECTOMY       Review of patient's allergies indicates:   Allergen Reactions    Shellfish containing products Swelling       Current Outpatient Medications:     clindamycin (CLEOCIN) 150 MG capsule, Take 1 capsule (150 mg total) by mouth every 6 (six) hours., Disp: 40 capsule, Rfl: 0    mupirocin (BACTROBAN) 2 % ointment, Apply topically 2 (two) times daily., Disp: 30 g, Rfl: 3    sulfamethoxazole-trimethoprim 800-160mg (BACTRIM DS) 800-160 mg Tab, Take 1 tablet by mouth 2 (two) times daily., Disp: 20 tablet, Rfl: 0  No current facility-administered medications for this visit.    ROS: A focused review of systems was obtained and negative.     Exam: A focused skin exam was performed. All areas examined were normal except as mentioned in the assessment and plan below.  General Appearance of the patient is well developed and well nourished.  Orientation: alert and oriented x 3.  Mood and affect: pleasant.    Assessment:   The primary encounter diagnosis was Hidradenitis suppurativa. A diagnosis of High risk medication use was also pertinent to this visit.    Plan:      Hidradenitis Suppurativa  - weeping acneiform pustules and papules, scarring, and acneiform nodules  Dailey Stage: II  Status: Inadequately controlled      Plan: Counseling.  I counseled the patient regarding the following:  Skin care: Cleanse acneiform lesions and sinus tracts with anti-bacterial washes. Oral antibiotics can help  reduce  inflammation.  Expectations: Hidradenitis Suppurativa is characterized by acneiform lesions and draining sinus tracts in the  axillary, abdominal or inguinal folds.  Contact office if: Patient develops worsening lesions or fistula formation despite treatment.     - differential diagnosis still included skin infection as pt has had a positive culture  - I will repeat the culture today will continue antibiotics until negative culture is obtained  -will plan to start Humira if disease persists after clearing the infection    High Risk Medication Monitoring (Z79.899) : The risks and benefits of the medication were reviewed in full with the patient. Should any side effects occur, the patient will stop the medication and contact me immediately.    - will order quant gold    Humira Counseling: I discussed with the patient the risks of adalimumab including but not limited to myelosuppression, immunosuppression, autoimmune hepatitis, demyelinating diseases, lymphoma, and serious infections. The patient understands that monitoring is required including a PPD at baseline and must alert us or the primary physician if symptoms of infection or other concerning signs are noted.        Follow up in about 2 months (around 1/1/2023).    Tari Brown MD

## 2022-11-04 LAB — MICROORGANISM SPEC CULT: ABNORMAL

## 2022-11-07 ENCOUNTER — OFFICE VISIT (OUTPATIENT)
Dept: FAMILY MEDICINE | Facility: CLINIC | Age: 22
End: 2022-11-07

## 2022-11-07 VITALS
DIASTOLIC BLOOD PRESSURE: 74 MMHG | HEIGHT: 69 IN | RESPIRATION RATE: 18 BRPM | TEMPERATURE: 98 F | HEART RATE: 105 BPM | BODY MASS INDEX: 46.65 KG/M2 | WEIGHT: 315 LBS | SYSTOLIC BLOOD PRESSURE: 118 MMHG | OXYGEN SATURATION: 98 %

## 2022-11-07 DIAGNOSIS — Z79.899 HIGH RISK MEDICATION USE: ICD-10-CM

## 2022-11-07 DIAGNOSIS — L73.2 HIDRADENITIS SUPPURATIVA: Primary | ICD-10-CM

## 2022-11-07 PROCEDURE — 86481 TB AG RESPONSE T-CELL SUSP: CPT | Mod: ,,, | Performed by: CLINICAL MEDICAL LABORATORY

## 2022-11-07 PROCEDURE — 99213 PR OFFICE/OUTPT VISIT, EST, LEVL III, 20-29 MIN: ICD-10-PCS | Mod: ,,, | Performed by: NURSE PRACTITIONER

## 2022-11-07 PROCEDURE — 86481 QUANTIFERON GOLD TB: ICD-10-PCS | Mod: ,,, | Performed by: CLINICAL MEDICAL LABORATORY

## 2022-11-07 PROCEDURE — 99213 OFFICE O/P EST LOW 20 MIN: CPT | Mod: ,,, | Performed by: NURSE PRACTITIONER

## 2022-11-07 RX ORDER — MUPIROCIN 20 MG/G
OINTMENT TOPICAL 2 TIMES DAILY
Qty: 30 G | Refills: 3 | Status: SHIPPED | OUTPATIENT
Start: 2022-11-07 | End: 2023-05-02 | Stop reason: SDUPTHER

## 2022-11-07 NOTE — PROGRESS NOTES
Diana Brownlee NP   [facility]  [address]  [place]     PATIENT NAME: Cally Bearden  : 2000  DATE: 22  MRN: [id number]      Billing Provider: Diana Brownlee NP  Level of Service:   Patient PCP Information       Provider PCP Type    Diana Brownlee NP General            Reason for Visit / Chief Complaint: Follow-up (Hidradenitis suppurativa, has seen dermatology, will get labs today for her before starting medications)       Update PCP  Update Chief Complaint         History of Present Illness / Problem Focused Workflow     Cally Bearden presents to the clinic here for eval of hidradenitis suppurativa, has seen dr middleton and will obtain lab per her order today before her starting Humira      Review of Systems     Review of Systems   Constitutional:  Negative for chills, fatigue and fever.   HENT:  Negative for nasal congestion, ear pain, facial swelling, hearing loss, mouth dryness, mouth sores, postnasal drip, rhinorrhea, sinus pressure/congestion and goiter.    Eyes:  Negative for discharge and itching.   Respiratory:  Negative for cough, shortness of breath and wheezing.    Cardiovascular:  Negative for chest pain and leg swelling.   Gastrointestinal:  Negative for abdominal pain, change in bowel habit and change in bowel habit.   Genitourinary:  Negative for difficulty urinating, dysuria, enuresis, frequency, hematuria and urgency.   Integumentary:         Abscesses under arms   Neurological:  Negative for dizziness, vertigo, syncope, weakness and headaches.   Psychiatric/Behavioral:  Negative for decreased concentration.       Medical / Social / Family History   History reviewed. No pertinent past medical history.    Past Surgical History:   Procedure Laterality Date    bilateral knee surgery      TONSILLECTOMY         Social History    reports that he has never smoked. He has never used smokeless tobacco. He reports that he does not drink alcohol and does not use  "drugs.    Family History  's family history is not on file.    Medications and Allergies     Medications  Outpatient Medications Marked as Taking for the 11/7/22 encounter (Office Visit) with Diana Brownlee NP   Medication Sig Dispense Refill    clindamycin (CLEOCIN) 150 MG capsule Take 1 capsule (150 mg total) by mouth every 6 (six) hours. 40 capsule 0    sulfamethoxazole-trimethoprim 800-160mg (BACTRIM DS) 800-160 mg Tab Take 1 tablet by mouth 2 (two) times daily. 20 tablet 0    [DISCONTINUED] mupirocin (BACTROBAN) 2 % ointment Apply topically 2 (two) times daily. 30 g 3       Allergies  Review of patient's allergies indicates:   Allergen Reactions    Shellfish containing products Swelling       Physical Examination     Vitals:    11/07/22 1351   BP: 118/74   BP Location: Left arm   Patient Position: Sitting   Pulse: 105   Resp: 18   Temp: 98.4 °F (36.9 °C)   TempSrc: Oral   SpO2: 98%   Weight: (!) 154.2 kg (340 lb)   Height: 5' 9" (1.753 m)      Physical Exam  Vitals and nursing note reviewed.   Constitutional:       Appearance: Normal appearance.   HENT:      Head: Normocephalic.      Right Ear: Tympanic membrane, ear canal and external ear normal.      Left Ear: Tympanic membrane, ear canal and external ear normal.      Nose: Nose normal.      Mouth/Throat:      Mouth: Mucous membranes are moist.      Pharynx: Oropharynx is clear.   Eyes:      Extraocular Movements: Extraocular movements intact.      Conjunctiva/sclera: Conjunctivae normal.      Pupils: Pupils are equal, round, and reactive to light.   Cardiovascular:      Rate and Rhythm: Normal rate and regular rhythm.      Pulses: Normal pulses.      Heart sounds: Normal heart sounds.   Pulmonary:      Effort: Pulmonary effort is normal.      Breath sounds: Normal breath sounds.   Abdominal:      General: Bowel sounds are normal.      Palpations: Abdomen is soft.   Musculoskeletal:         General: Normal range of motion.      Cervical back: Normal " range of motion and neck supple.   Skin:     General: Skin is warm and dry.      Capillary Refill: Capillary refill takes less than 2 seconds.      Comments: Abscesses under fariba arms healing, sm amt drainage noted on bandages. Much improvement noted, areas cleaned with soap and water, sterile dressings applied, amer[place] well   Neurological:      General: No focal deficit present.      Mental Status: He is alert and oriented to person, place, and time.   Psychiatric:         Mood and Affect: Mood normal.         Behavior: Behavior normal.        Assessment and Plan (including Health Maintenance)      Problem List  Smart Sets  Document Outside HM   :    Plan: cont cleaning with antibacterial soap and dress areas bid, notify clinic of increased drainage or erythema, keep all scheduled kathy with dr middleton    There are no diagnoses linked to this encounter.        Health Maintenance Due   Topic Date Due    Hepatitis C Screening  Never done    COVID-19 Vaccine (1) Never done    HPV Vaccines (1 - Male 2-dose series) Never done    TETANUS VACCINE  Never done    Influenza Vaccine (1) Never done       Problem List Items Addressed This Visit    None        The patient has no Health Maintenance topics of status Not Due    Procedures     Future Appointments   Date Time Provider Department Center   1/3/2023  3:00 PM Tari Brown MD [facility]        No follow-ups on file.       Signature:  Diana Brownlee NP    Date of encounter: 11/7/22

## 2022-11-07 NOTE — PATIENT INSTRUCTIONS
cont cleaning with antibacterial soap and dress areas bid, notify clinic of increased drainage or erythema, keep all scheduled kathy with dr middleton

## 2022-11-14 LAB
MITOGEN MINUS NIL RESULT, TB: >10
NIL RESULT, TB: 0.02
QUANTIFERON-TB GOLD PLUS RESULT: NEGATIVE
TB1 AG MINUS NIL RESULT: 0.01
TB2 AG MINUS NIL RESULT: 0.01

## 2022-11-23 ENCOUNTER — OFFICE VISIT (OUTPATIENT)
Dept: FAMILY MEDICINE | Facility: CLINIC | Age: 22
End: 2022-11-23

## 2022-11-23 VITALS
DIASTOLIC BLOOD PRESSURE: 60 MMHG | RESPIRATION RATE: 20 BRPM | TEMPERATURE: 98 F | BODY MASS INDEX: 46.65 KG/M2 | OXYGEN SATURATION: 96 % | HEIGHT: 69 IN | WEIGHT: 315 LBS | HEART RATE: 108 BPM | SYSTOLIC BLOOD PRESSURE: 110 MMHG

## 2022-11-23 DIAGNOSIS — L73.2 HIDRADENITIS SUPPURATIVA: Primary | ICD-10-CM

## 2022-11-23 PROCEDURE — 99214 PR OFFICE/OUTPT VISIT, EST, LEVL IV, 30-39 MIN: ICD-10-PCS | Mod: 25,,, | Performed by: NURSE PRACTITIONER

## 2022-11-23 PROCEDURE — 96372 PR INJECTION,THERAP/PROPH/DIAG2ST, IM OR SUBCUT: ICD-10-PCS | Mod: ,,, | Performed by: NURSE PRACTITIONER

## 2022-11-23 PROCEDURE — 99214 OFFICE O/P EST MOD 30 MIN: CPT | Mod: 25,,, | Performed by: NURSE PRACTITIONER

## 2022-11-23 PROCEDURE — 96372 THER/PROPH/DIAG INJ SC/IM: CPT | Mod: ,,, | Performed by: NURSE PRACTITIONER

## 2022-11-23 RX ORDER — CEFTRIAXONE 1 G/1
1 INJECTION, POWDER, FOR SOLUTION INTRAMUSCULAR; INTRAVENOUS
Status: COMPLETED | OUTPATIENT
Start: 2022-11-23 | End: 2022-11-23

## 2022-11-23 RX ORDER — DOXYCYCLINE 100 MG/1
100 CAPSULE ORAL EVERY 12 HOURS
Qty: 20 CAPSULE | Refills: 0 | Status: SHIPPED | OUTPATIENT
Start: 2022-11-23 | End: 2022-11-28 | Stop reason: ALTCHOICE

## 2022-11-23 RX ADMIN — CEFTRIAXONE 1 G: 1 INJECTION, POWDER, FOR SOLUTION INTRAMUSCULAR; INTRAVENOUS at 04:11

## 2022-11-23 NOTE — PROGRESS NOTES
Diana Brownlee NP   [facility]  [address]  [place]     PATIENT NAME: Cally Bearden  : 2000  DATE: 22  MRN: [id number]      Billing Provider: Diana Brownlee NP  Level of Service:   Patient PCP Information       Provider PCP Type    Diana Brownlee NP General            Reason for Visit / Chief Complaint: Follow-up (F/u   hidradenitis suppurativa.  Pt states he is doing well.  Still has 4 capsules of clindamycin left.)       Update PCP  Update Chief Complaint         History of Present Illness / Problem Focused Workflow     Cally Bearden presents to the clinic here for eval of hydroadenitis supppurative, states he is doing well,       Review of Systems     Review of Systems   Constitutional:  Negative for chills, fatigue and fever.   HENT:  Negative for nasal congestion, ear pain, facial swelling, hearing loss, mouth dryness, mouth sores, postnasal drip, rhinorrhea, sinus pressure/congestion and goiter.    Eyes:  Negative for discharge and itching.   Respiratory:  Negative for cough, shortness of breath and wheezing.    Cardiovascular:  Negative for chest pain and leg swelling.   Gastrointestinal:  Negative for abdominal pain, change in bowel habit and change in bowel habit.   Genitourinary:  Negative for difficulty urinating, dysuria, enuresis, frequency, hematuria and urgency.   Integumentary:         Abscesses of axillary region, arms and right upper abd   Neurological:  Negative for dizziness, vertigo, syncope, weakness and headaches.   Psychiatric/Behavioral:  Negative for decreased concentration.       Medical / Social / Family History   History reviewed. No pertinent past medical history.    Past Surgical History:   Procedure Laterality Date    bilateral knee surgery      TONSILLECTOMY         Social History    reports that he has never smoked. He has never used smokeless tobacco. He reports that he does not drink alcohol and does not use drugs.    Family History  Mr.'s  "family history is not on file.    Medications and Allergies     Medications  Outpatient Medications Marked as Taking for the 11/23/22 encounter (Office Visit) with Diana Brownlee NP   Medication Sig Dispense Refill    clindamycin (CLEOCIN) 150 MG capsule Take 1 capsule (150 mg total) by mouth every 6 (six) hours. 40 capsule 0    mupirocin (BACTROBAN) 2 % ointment Apply topically 2 (two) times daily. 30 g 3     Current Facility-Administered Medications for the 11/23/22 encounter (Office Visit) with Diana Brownlee NP   Medication Dose Route Frequency Provider Last Rate Last Admin    cefTRIAXone injection 1 g  1 g Intramuscular 1 time in Clinic/HOD Diana Brownlee NP           Allergies  Review of patient's allergies indicates:   Allergen Reactions    Shellfish containing products Swelling       Physical Examination     Vitals:    11/23/22 1611   BP: 110/60   BP Location: Right arm   Patient Position: Sitting   BP Method: Medium (Manual)   Pulse: 108   Resp: 20   Temp: 98 °F (36.7 °C)   TempSrc: Oral   SpO2: 96%   Weight: (!) 147.6 kg (325 lb 8 oz)   Height: 5' 9.02" (1.753 m)      Physical Exam  Constitutional:       Appearance: Normal appearance.   HENT:      Head: Normocephalic.      Right Ear: Tympanic membrane, ear canal and external ear normal.      Left Ear: Tympanic membrane, ear canal and external ear normal.      Nose: Nose normal.      Mouth/Throat:      Mouth: Mucous membranes are moist.      Pharynx: Oropharynx is clear.   Eyes:      Extraocular Movements: Extraocular movements intact.      Conjunctiva/sclera: Conjunctivae normal.      Pupils: Pupils are equal, round, and reactive to light.   Cardiovascular:      Rate and Rhythm: Normal rate and regular rhythm.      Pulses: Normal pulses.      Heart sounds: Normal heart sounds.   Pulmonary:      Effort: Pulmonary effort is normal.      Breath sounds: Normal breath sounds.   Abdominal:      General: Bowel sounds are normal.      Palpations: Abdomen is " soft.   Musculoskeletal:         General: Normal range of motion.   Skin:     General: Skin is warm and dry.      Capillary Refill: Capillary refill takes less than 2 seconds.      Comments: Oozing abscesses fariba axillary regions, right post upper arm and upper left abd, sm areas noted. Cleaned with soap and water and telfa dressings applied   Neurological:      General: No focal deficit present.      Mental Status: He is alert and oriented to person, place, and time.   Psychiatric:         Mood and Affect: Mood normal.         Behavior: Behavior normal.        Assessment and Plan (including Health Maintenance)      Problem List  Smart Sets  Document Outside HM   :    Plan:     There are no diagnoses linked to this encounter.        Health Maintenance Due   Topic Date Due    Hepatitis C Screening  Never done    COVID-19 Vaccine (1) Never done    HPV Vaccines (1 - Male 2-dose series) Never done    TETANUS VACCINE  Never done    Influenza Vaccine (1) Never done       Problem List Items Addressed This Visit    None        The patient has no Health Maintenance topics of status Not Due    Procedures     Future Appointments   Date Time Provider Department Center   11/23/2022  4:30 PM Diana Brownlee NP [facility]   1/3/2023  3:00 PM Tari Brown MD [facility]        No follow-ups on file.       Signature:  Diana Brownlee NP    Date of encounter: 11/23/22

## 2022-11-28 ENCOUNTER — OFFICE VISIT (OUTPATIENT)
Dept: FAMILY MEDICINE | Facility: CLINIC | Age: 22
End: 2022-11-28

## 2022-11-28 VITALS
OXYGEN SATURATION: 99 % | BODY MASS INDEX: 46.65 KG/M2 | HEIGHT: 69 IN | SYSTOLIC BLOOD PRESSURE: 128 MMHG | WEIGHT: 315 LBS | DIASTOLIC BLOOD PRESSURE: 80 MMHG | TEMPERATURE: 99 F | RESPIRATION RATE: 18 BRPM | HEART RATE: 79 BPM

## 2022-11-28 DIAGNOSIS — L73.2 HIDRADENITIS SUPPURATIVA: Primary | ICD-10-CM

## 2022-11-28 PROCEDURE — 99213 PR OFFICE/OUTPT VISIT, EST, LEVL III, 20-29 MIN: ICD-10-PCS | Mod: ,,, | Performed by: NURSE PRACTITIONER

## 2022-11-28 PROCEDURE — 99213 OFFICE O/P EST LOW 20 MIN: CPT | Mod: ,,, | Performed by: NURSE PRACTITIONER

## 2022-11-29 NOTE — PROGRESS NOTES
Diana Brownlee NP   [facility]  [address]  [place]     PATIENT NAME: Cally Bearden  : 2000  DATE: 22  MRN: [id number]      Billing Provider: Diana Brownlee NP  Level of Service:   Patient PCP Information       Provider PCP Type    Diana Brownlee NP General            Reason for Visit / Chief Complaint: Follow-up (Hidradenitis suppurativa/States areas have improved overall. /Area to right side has improved and drainage has decreased. )       Update PCP  Update Chief Complaint         History of Present Illness / Problem Focused Workflow     Cally Bearden presents to the clinic   Here for eval of Hidrdadenitis suppurative, staed that areas have improved, still with some drainage on left side    Review of Systems     Review of Systems   Constitutional:  Negative for chills, fatigue and fever.   HENT:  Negative for nasal congestion, ear pain, facial swelling, hearing loss, mouth dryness, mouth sores, postnasal drip, rhinorrhea, sinus pressure/congestion and goiter.    Eyes:  Negative for discharge and itching.   Respiratory:  Negative for cough, shortness of breath and wheezing.    Cardiovascular:  Negative for chest pain and leg swelling.   Gastrointestinal:  Negative for abdominal pain, change in bowel habit and change in bowel habit.   Genitourinary:  Negative for difficulty urinating, dysuria, enuresis, frequency, hematuria and urgency.   Integumentary:         Lesions under both arms, right side of upper abd/chest  with sm amt drainage, left axillary region with out drainage   Neurological:  Negative for dizziness, vertigo, syncope, weakness and headaches.   Psychiatric/Behavioral:  Negative for decreased concentration.       Medical / Social / Family History   History reviewed. No pertinent past medical history.    Past Surgical History:   Procedure Laterality Date    bilateral knee surgery      TONSILLECTOMY         Social History    reports that he has never smoked. He has  "never used smokeless tobacco. He reports that he does not drink alcohol and does not use drugs.    Family History  MrChana's family history is not on file.    Medications and Allergies     Medications  No outpatient medications have been marked as taking for the 11/28/22 encounter (Office Visit) with Diana Brownlee NP.       Allergies  Review of patient's allergies indicates:   Allergen Reactions    Shellfish containing products Swelling       Physical Examination     Vitals:    11/28/22 1501   BP: 128/80   BP Location: Left arm   Patient Position: Sitting   Pulse: 79   Resp: 18   Temp: 98.9 °F (37.2 °C)   TempSrc: Oral   SpO2: 99%   Weight: (!) 148.1 kg (326 lb 9.6 oz)   Height: 5' 9" (1.753 m)      Physical Exam  Vitals and nursing note reviewed.   Constitutional:       Appearance: Normal appearance. He is obese.   Cardiovascular:      Rate and Rhythm: Normal rate and regular rhythm.      Pulses: Normal pulses.   Pulmonary:      Effort: Pulmonary effort is normal.      Breath sounds: Normal breath sounds.   Skin:     Comments: Lesions under both arms, right side of upper abd/chest  with sm amt drainage, left axillary region with out drainage. Dressing on right upper arm without drainage, wound clean, dressing under right arm with sm amt old drainage, abd/chest area without drainage, much improvedment noted   Neurological:      Mental Status: He is alert.        Assessment and Plan (including Health Maintenance)      Problem List  Smart Sets  Document Outside HM   :    Plan: con cleaning thoroughly with soap and water, keep all scheduled kathy. Return to clinic as scheduled in 2 weeks or sooner if needed    Hidradenitis suppurativa          Health Maintenance Due   Topic Date Due    Hepatitis C Screening  Never done    COVID-19 Vaccine (1) Never done    HPV Vaccines (1 - Male 2-dose series) Never done    TETANUS VACCINE  Never done    Influenza Vaccine (1) Never done       Problem List Items Addressed This Visit       "    Derm    Hidradenitis suppurativa - Primary         The patient has no Health Maintenance topics of status Not Due    Procedures     Future Appointments   Date Time Provider Department Center   12/14/2022  4:15 PM Diana Brownlee NP [facility]   1/3/2023  3:00 PM Tari Brown MD [facility]        Follow up in about 2 weeks (around 12/12/2022).       Signature:  Diana Brownlee NP    Date of encounter: 11/28/22

## 2022-12-14 ENCOUNTER — OFFICE VISIT (OUTPATIENT)
Dept: FAMILY MEDICINE | Facility: CLINIC | Age: 22
End: 2022-12-14

## 2022-12-14 VITALS
SYSTOLIC BLOOD PRESSURE: 130 MMHG | DIASTOLIC BLOOD PRESSURE: 72 MMHG | HEIGHT: 69 IN | BODY MASS INDEX: 46.65 KG/M2 | RESPIRATION RATE: 20 BRPM | TEMPERATURE: 99 F | OXYGEN SATURATION: 99 % | WEIGHT: 315 LBS | HEART RATE: 78 BPM

## 2022-12-14 DIAGNOSIS — L73.2 HIDRADENITIS SUPPURATIVA: Primary | ICD-10-CM

## 2022-12-14 PROCEDURE — 99213 OFFICE O/P EST LOW 20 MIN: CPT | Mod: ,,, | Performed by: NURSE PRACTITIONER

## 2022-12-14 PROCEDURE — 99213 PR OFFICE/OUTPT VISIT, EST, LEVL III, 20-29 MIN: ICD-10-PCS | Mod: ,,, | Performed by: NURSE PRACTITIONER

## 2022-12-14 RX ORDER — CLINDAMYCIN HYDROCHLORIDE 300 MG/1
300 CAPSULE ORAL EVERY 8 HOURS
Qty: 30 CAPSULE | Refills: 0 | Status: SHIPPED | OUTPATIENT
Start: 2022-12-14 | End: 2023-05-02 | Stop reason: SDUPTHER

## 2022-12-14 NOTE — PATIENT INSTRUCTIONS
Clean areas with soap and water, apply bactroban and dressings daily, meds as ordered, return to [facility] as needed, keep kathy with dr middleton as scheduled

## 2022-12-14 NOTE — PROGRESS NOTES
Diana Brownlee NP   [facility]  [address] [address] [place]     PATIENT NAME: Cally Bearden  : 2000  DATE: 22  MRN: [id number]      Billing Provider: Diana Brownele NP  Level of Service: CA OFFICE/OUTPT VISIT, EST, LEVL III, 20-29 MIN  Patient PCP Information       Provider PCP Type    Diana Brownlee NP General            Reason for Visit / Chief Complaint: Follow-up (F/u hidradenitis suppurativa.  Pt states he is doing well.  Has not started  medication prescribed by Dr. Brown yet but the insurance has approved and it has been ordered.)       Update PCP  Update Chief Complaint         History of Present Illness / Problem Focused Workflow     Cally Bearden presents to the clinic here for f/u hidradenitis suppurativa, pt staes he is doing well, has not started med prescribed per dr middleton as ins still has not approved      Review of Systems     Review of Systems   Constitutional:  Negative for chills, fatigue and fever.   HENT:  Negative for nasal congestion, ear pain, facial swelling, hearing loss, mouth dryness, mouth sores, postnasal drip, rhinorrhea, sinus pressure/congestion and goiter.    Eyes:  Negative for discharge and itching.   Respiratory:  Negative for cough, shortness of breath and wheezing.    Cardiovascular:  Negative for chest pain and leg swelling.   Gastrointestinal:  Negative for abdominal pain, change in bowel habit and change in bowel habit.   Genitourinary:  Negative for difficulty urinating, dysuria, enuresis, frequency, hematuria and urgency.   Integumentary:         Several areas under arms oozing purulent drainage, sm area on right lat lower chest wall and mid lat chest wall oozing drainage, purulent,    Neurological:  Negative for dizziness, vertigo, syncope, weakness and headaches.   Psychiatric/Behavioral:  Negative for decreased concentration.       Medical / Social / Family History   History reviewed. No pertinent past medical history.    Past Surgical  "History:   Procedure Laterality Date    bilateral knee surgery      TONSILLECTOMY         Social History    reports that he has never smoked. He has never used smokeless tobacco. He reports that he does not drink alcohol and does not use drugs.    Family History  's family history is not on file.    Medications and Allergies     Medications  No outpatient medications have been marked as taking for the 12/14/22 encounter (Office Visit) with Diana Brownlee NP.       Allergies  Review of patient's allergies indicates:   Allergen Reactions    Shellfish containing products Swelling       Physical Examination     Vitals:    12/14/22 1619   BP: 130/72   BP Location: Left arm   Patient Position: Sitting   BP Method: Large (Manual)   Pulse: 78   Resp: 20   Temp: 98.5 °F (36.9 °C)   TempSrc: Oral   SpO2: 99%   Weight: (!) 149.2 kg (329 lb)   Height: 5' 9.02" (1.753 m)      Physical Exam  Constitutional:       Appearance: Normal appearance.   HENT:      Head: Normocephalic.      Right Ear: Tympanic membrane, ear canal and external ear normal.      Left Ear: Tympanic membrane, ear canal and external ear normal.      Nose: Nose normal.      Mouth/Throat:      Mouth: Mucous membranes are moist.      Pharynx: Oropharynx is clear.   Eyes:      Extraocular Movements: Extraocular movements intact.      Conjunctiva/sclera: Conjunctivae normal.      Pupils: Pupils are equal, round, and reactive to light.   Cardiovascular:      Rate and Rhythm: Normal rate and regular rhythm.      Pulses: Normal pulses.      Heart sounds: Normal heart sounds.   Pulmonary:      Effort: Pulmonary effort is normal.      Breath sounds: Normal breath sounds.   Abdominal:      General: Bowel sounds are normal.      Palpations: Abdomen is soft.   Musculoskeletal:         General: Normal range of motion.   Skin:     General: Skin is warm and dry.      Capillary Refill: Capillary refill takes less than 2 seconds.      Comments: Several areas under arms " oozing purulent drainage, sm area on right lat lower chest wall and mid lat chest wall oozing drainage, purulent, areas cleaned with soap and water and sterile nonstick dressing applied, [place] well.    Neurological:      General: No focal deficit present.      Mental Status: He is alert and oriented to person, place, and time.   Psychiatric:         Mood and Affect: Mood normal.         Behavior: Behavior normal.        Assessment and Plan (including Health Maintenance)      Problem List  Smart [employer]  Document Outside HM   :    Plan: Clean areas with soap and water, apply bactroban and dressings daily, meds as ordered, return to [facility] as needed, keep kathy with dr middleton as scheduled    There are no diagnoses linked to this encounter.        Health Maintenance Due   Topic Date Due    Hepatitis C Screening  Never done    COVID-19 Vaccine (1) Never done    HPV Vaccines (1 - Male 2-dose series) Never done    TETANUS VACCINE  Never done    Influenza Vaccine (1) Never done       Problem List Items Addressed This Visit    None        The patient has no Health Maintenance topics of status Not Due    Procedures     Future Appointments   Date Time Provider Department Center   1/3/2023  3:00 PM Tari Brown MD [facility]        Follow up if symptoms worsen or fail to improve.       Signature:  Diana Brownlee NP    Date of encounter: 12/14/22

## 2023-05-02 ENCOUNTER — OFFICE VISIT (OUTPATIENT)
Dept: FAMILY MEDICINE | Facility: CLINIC | Age: 23
End: 2023-05-02
Payer: COMMERCIAL

## 2023-05-02 VITALS
OXYGEN SATURATION: 98 % | DIASTOLIC BLOOD PRESSURE: 76 MMHG | RESPIRATION RATE: 16 BRPM | TEMPERATURE: 98 F | BODY MASS INDEX: 46.51 KG/M2 | WEIGHT: 314 LBS | HEART RATE: 87 BPM | HEIGHT: 69 IN | SYSTOLIC BLOOD PRESSURE: 113 MMHG

## 2023-05-02 DIAGNOSIS — L73.2 HIDRADENITIS SUPPURATIVA: Primary | ICD-10-CM

## 2023-05-02 PROCEDURE — 3008F PR BODY MASS INDEX (BMI) DOCUMENTED: ICD-10-PCS | Mod: ICN,,, | Performed by: NURSE PRACTITIONER

## 2023-05-02 PROCEDURE — 3008F BODY MASS INDEX DOCD: CPT | Mod: ICN,,, | Performed by: NURSE PRACTITIONER

## 2023-05-02 PROCEDURE — 99214 PR OFFICE/OUTPT VISIT, EST, LEVL IV, 30-39 MIN: ICD-10-PCS | Mod: 25,ICN,, | Performed by: NURSE PRACTITIONER

## 2023-05-02 PROCEDURE — 99214 OFFICE O/P EST MOD 30 MIN: CPT | Mod: 25,ICN,, | Performed by: NURSE PRACTITIONER

## 2023-05-02 PROCEDURE — 3074F PR MOST RECENT SYSTOLIC BLOOD PRESSURE < 130 MM HG: ICD-10-PCS | Mod: ICN,,, | Performed by: NURSE PRACTITIONER

## 2023-05-02 PROCEDURE — 3078F PR MOST RECENT DIASTOLIC BLOOD PRESSURE < 80 MM HG: ICD-10-PCS | Mod: ICN,,, | Performed by: NURSE PRACTITIONER

## 2023-05-02 PROCEDURE — 96372 PR INJECTION,THERAP/PROPH/DIAG2ST, IM OR SUBCUT: ICD-10-PCS | Mod: ICN,,, | Performed by: NURSE PRACTITIONER

## 2023-05-02 PROCEDURE — 1159F PR MEDICATION LIST DOCUMENTED IN MEDICAL RECORD: ICD-10-PCS | Mod: ICN,,, | Performed by: NURSE PRACTITIONER

## 2023-05-02 PROCEDURE — 96372 THER/PROPH/DIAG INJ SC/IM: CPT | Mod: ICN,,, | Performed by: NURSE PRACTITIONER

## 2023-05-02 PROCEDURE — 3074F SYST BP LT 130 MM HG: CPT | Mod: ICN,,, | Performed by: NURSE PRACTITIONER

## 2023-05-02 PROCEDURE — 1159F MED LIST DOCD IN RCRD: CPT | Mod: ICN,,, | Performed by: NURSE PRACTITIONER

## 2023-05-02 PROCEDURE — 3078F DIAST BP <80 MM HG: CPT | Mod: ICN,,, | Performed by: NURSE PRACTITIONER

## 2023-05-02 RX ORDER — CLINDAMYCIN HYDROCHLORIDE 300 MG/1
300 CAPSULE ORAL EVERY 8 HOURS
Qty: 30 CAPSULE | Refills: 0 | OUTPATIENT
Start: 2023-05-02 | End: 2024-02-22

## 2023-05-02 RX ORDER — MUPIROCIN 20 MG/G
OINTMENT TOPICAL 2 TIMES DAILY
Qty: 30 G | Refills: 3 | OUTPATIENT
Start: 2023-05-02 | End: 2024-02-22

## 2023-05-02 RX ORDER — CEFTRIAXONE 1 G/1
1 INJECTION, POWDER, FOR SOLUTION INTRAMUSCULAR; INTRAVENOUS
Status: COMPLETED | OUTPATIENT
Start: 2023-05-02 | End: 2023-05-02

## 2023-05-02 RX ADMIN — CEFTRIAXONE 1 G: 1 INJECTION, POWDER, FOR SOLUTION INTRAMUSCULAR; INTRAVENOUS at 04:05

## 2023-05-02 NOTE — PROGRESS NOTES
Diana Brownlee NP   [facility]  [address]  [place]     PATIENT NAME: Cally Bearden  : 2000  DATE: 23  MRN: [id number]      Billing Provider: Diana Brownlee NP  Level of Service:   Patient PCP Information       Provider PCP Type    Diana Brownlee NP General            Reason for Visit / Chief Complaint: Lesion (Yellow Discharge noted under armpit inflamed. Abd pad X1 underneath armpits.)       Update PCP  Update Chief Complaint         History of Present Illness / Problem Focused Workflow     Cally Bearden presents to the clinic pt is her for eval of hidradenitis suppurativa. This has started draining sm amt purulent discharge, mom stated that they wont stop draining      Review of Systems     Review of Systems   Constitutional:  Negative for chills, fatigue and fever.   HENT:  Negative for nasal congestion, ear pain, facial swelling, hearing loss, mouth dryness, mouth sores, postnasal drip, rhinorrhea, sinus pressure/congestion and goiter.    Eyes:  Negative for discharge and itching.   Respiratory:  Negative for cough, shortness of breath and wheezing.    Cardiovascular:  Negative for chest pain and leg swelling.   Gastrointestinal:  Negative for abdominal pain, change in bowel habit and change in bowel habit.   Genitourinary:  Negative for difficulty urinating, dysuria, enuresis, frequency, hematuria and urgency.   Integumentary:         Draining painful lumps under both arms   Neurological:  Negative for dizziness, vertigo, syncope, weakness and headaches.   Psychiatric/Behavioral:  Negative for decreased concentration.       Medical / Social / Family History   History reviewed. No pertinent past medical history.    Past Surgical History:   Procedure Laterality Date    bilateral knee surgery      TONSILLECTOMY         Social History    reports that he has never smoked. He has never used smokeless tobacco. He reports that he does not drink alcohol and does not use  "drugs.    Family History  's family history is not on file.    Medications and Allergies     Medications  No outpatient medications have been marked as taking for the 5/2/23 encounter (Office Visit) with Diana Brownlee NP.     Current Facility-Administered Medications for the 5/2/23 encounter (Office Visit) with Diana Brownlee NP   Medication Dose Route Frequency Provider Last Rate Last Admin    cefTRIAXone injection 1 g  1 g Intramuscular 1 time in Clinic/HOD Diana Brownlee NP           Allergies  Review of patient's allergies indicates:   Allergen Reactions    Shellfish containing products Swelling       Physical Examination     Vitals:    05/02/23 1516   BP: 113/76   BP Location: Left arm   Patient Position: Sitting   Pulse: 87   Resp: 16   Temp: 98.4 °F (36.9 °C)   TempSrc: Oral   SpO2: 98%   Weight: (!) 142.4 kg (314 lb)   Height: 5' 9.02" (1.753 m)      Physical Exam  Constitutional:       Appearance: Normal appearance.   HENT:      Head: Normocephalic.      Right Ear: Tympanic membrane, ear canal and external ear normal.      Left Ear: Tympanic membrane, ear canal and external ear normal.      Nose: Nose normal.      Mouth/Throat:      Mouth: Mucous membranes are moist.      Pharynx: Oropharynx is clear.   Eyes:      Extraocular Movements: Extraocular movements intact.      Conjunctiva/sclera: Conjunctivae normal.      Pupils: Pupils are equal, round, and reactive to light.   Cardiovascular:      Rate and Rhythm: Normal rate and regular rhythm.      Pulses: Normal pulses.      Heart sounds: Normal heart sounds.   Pulmonary:      Effort: Pulmonary effort is normal.      Breath sounds: Normal breath sounds.   Abdominal:      General: Bowel sounds are normal.      Palpations: Abdomen is soft.   Musculoskeletal:         General: Normal range of motion.      Cervical back: Normal range of motion and neck supple.   Skin:     General: Skin is warm and dry.      Capillary Refill: Capillary refill takes less " than 2 seconds.      Comments: Multiple Draining painful lumps under both arms    Areas cleaned with saline and sterile dressings applied,    Neurological:      General: No focal deficit present.      Mental Status: He is alert and oriented to person, place, and time.   Psychiatric:         Mood and Affect: Mood normal.         Behavior: Behavior normal.        Assessment and Plan (including Health Maintenance)      Problem List  [employer]  Document Outside HM   :    Plan:     Hidradenitis suppurativa  -     Ambulatory referral/consult to Dermatology; Future; Expected date: 05/02/2023    Other orders  -     mupirocin (BACTROBAN) 2 % ointment; Apply topically 2 (two) times daily.  Dispense: 30 g; Refill: 3  -     clindamycin (CLEOCIN) 300 MG capsule; Take 1 capsule (300 mg total) by mouth every 8 (eight) hours.  Dispense: 30 capsule; Refill: 0  -     cefTRIAXone injection 1 g            Health Maintenance Due   Topic Date Due    Hepatitis C Screening  Never done    COVID-19 Vaccine (1) Never done    HPV Vaccines (1 - Male 2-dose series) Never done    TETANUS VACCINE  Never done       Problem List Items Addressed This Visit          Derm    Hidradenitis suppurativa - Primary    Relevant Orders    Ambulatory referral/consult to Dermatology         Health Maintenance Topics with due status: Not Due       Topic Last Completion Date    Influenza Vaccine Not Due       Procedures     No future appointments.     No follow-ups on file.       Signature:  Diana Brownlee NP    Date of encounter: 5/2/23

## 2023-05-02 NOTE — ASSESSMENT & PLAN NOTE
Clean with soap and water bid, apply bactroban as ordered, take meds asordered, refer to dr middleton for evaluation

## 2023-05-24 ENCOUNTER — OFFICE VISIT (OUTPATIENT)
Dept: DERMATOLOGY | Facility: CLINIC | Age: 23
End: 2023-05-24
Payer: COMMERCIAL

## 2023-05-24 DIAGNOSIS — L73.2 HIDRADENITIS SUPPURATIVA: Primary | ICD-10-CM

## 2023-05-24 DIAGNOSIS — Z79.899 HIGH RISK MEDICATION USE: ICD-10-CM

## 2023-05-24 PROCEDURE — 99214 PR OFFICE/OUTPT VISIT, EST, LEVL IV, 30-39 MIN: ICD-10-PCS | Mod: ,,, | Performed by: DERMATOLOGY

## 2023-05-24 PROCEDURE — 1160F PR REVIEW ALL MEDS BY PRESCRIBER/CLIN PHARMACIST DOCUMENTED: ICD-10-PCS | Mod: ,,, | Performed by: DERMATOLOGY

## 2023-05-24 PROCEDURE — 1159F MED LIST DOCD IN RCRD: CPT | Mod: ,,, | Performed by: DERMATOLOGY

## 2023-05-24 PROCEDURE — 1160F RVW MEDS BY RX/DR IN RCRD: CPT | Mod: ,,, | Performed by: DERMATOLOGY

## 2023-05-24 PROCEDURE — 1159F PR MEDICATION LIST DOCUMENTED IN MEDICAL RECORD: ICD-10-PCS | Mod: ,,, | Performed by: DERMATOLOGY

## 2023-05-24 PROCEDURE — 99214 OFFICE O/P EST MOD 30 MIN: CPT | Mod: ,,, | Performed by: DERMATOLOGY

## 2023-05-24 NOTE — PROGRESS NOTES
[facility]   Tari Brown MD    Patient Name: Cally Bearden  Patient YOB: 2000   Date of Service: 5/24/23    CC: Follow-up Hidradenitis Suppurativa    HPI: Cally Bearden is a 22 y.o. male here today for follow-up of HS, last seen 11/22.  Previous treatments include antibiotics.  Overall, the HS is worse.  Treatment plan was followed as directed.    History reviewed. No pertinent past medical history.  Past Surgical History:   Procedure Laterality Date    bilateral knee surgery      TONSILLECTOMY       Review of patient's allergies indicates:   Allergen Reactions    Shellfish containing products Swelling       Current Outpatient Medications:     clindamycin (CLEOCIN) 300 MG capsule, Take 1 capsule (300 mg total) by mouth every 8 (eight) hours., Disp: 30 capsule, Rfl: 0    mupirocin (BACTROBAN) 2 % ointment, Apply topically 2 (two) times daily., Disp: 30 g, Rfl: 3    ROS: A focused review of systems was obtained and negative.     Exam: A focused skin exam was performed. All areas examined were normal except as mentioned in the assessment and plan below.  General Appearance of the patient is well developed and well nourished.  Orientation: alert and oriented x 3.  Mood and affect: pleasant.    Assessment:   The primary encounter diagnosis was Hidradenitis suppurativa. A diagnosis of High risk medication use was also pertinent to this visit.    Plan:        Hidradenitis Suppurativa  - weeping acneiform pustules and papules, scarring, and acneiform nodules  Dailey Stage: II  Status: Inadequately controlled        Plan: Counseling.  I counseled the patient regarding the following:  Skin care: Cleanse acneiform lesions and sinus tracts with anti-bacterial washes. Oral antibiotics can help reduce  inflammation.  Expectations: Hidradenitis Suppurativa is characterized by acneiform lesions and draining sinus tracts in the  axillary, abdominal or inguinal folds.  Contact office if: Patient  develops worsening lesions or fistula formation despite treatment.     Humira Counseling: I discussed with the patient the risks of adalimumab including but not limited to myelosuppression, immunosuppression, autoimmune hepatitis, demyelinating diseases, lymphoma, and serious infections. The patient understands that monitoring is required including a PPD at baseline and must alert us or the primary physician if symptoms of infection or other concerning signs are noted.    - Will start Humira and follow-up 3 months after receiving first injection.    High Risk Medication Monitoring (Z79.899) : The risks and benefits of the medication were reviewed in full with the patient. Should any side effects occur, the patient will stop the medication and contact me immediately.    - Quant gold 11/2022    Follow up if symptoms worsen or fail to improve.    Tari Brown MD

## 2024-02-22 ENCOUNTER — OFFICE VISIT (OUTPATIENT)
Dept: SURGERY | Facility: CLINIC | Age: 24
End: 2024-02-22
Payer: COMMERCIAL

## 2024-02-22 ENCOUNTER — HOSPITAL ENCOUNTER (EMERGENCY)
Facility: HOSPITAL | Age: 24
Discharge: HOME | End: 2024-02-22
Payer: COMMERCIAL

## 2024-02-22 VITALS
RESPIRATION RATE: 16 BRPM | DIASTOLIC BLOOD PRESSURE: 80 MMHG | HEIGHT: 69 IN | OXYGEN SATURATION: 97 % | HEART RATE: 83 BPM | BODY MASS INDEX: 35.99 KG/M2 | TEMPERATURE: 98 F | SYSTOLIC BLOOD PRESSURE: 127 MMHG | WEIGHT: 243 LBS

## 2024-02-22 DIAGNOSIS — L02.91 ABSCESS: ICD-10-CM

## 2024-02-22 DIAGNOSIS — L73.2 HIDRADENITIS SUPPURATIVA: ICD-10-CM

## 2024-02-22 DIAGNOSIS — L73.2 HIDRADENITIS SUPPURATIVA: Primary | ICD-10-CM

## 2024-02-22 DIAGNOSIS — L02.91 ABSCESS: Primary | ICD-10-CM

## 2024-02-22 PROCEDURE — 99212 OFFICE O/P EST SF 10 MIN: CPT | Mod: PBBFAC | Performed by: STUDENT IN AN ORGANIZED HEALTH CARE EDUCATION/TRAINING PROGRAM

## 2024-02-22 PROCEDURE — 10061 I&D ABSCESS COMP/MULTIPLE: CPT | Mod: PBBFAC | Performed by: STUDENT IN AN ORGANIZED HEALTH CARE EDUCATION/TRAINING PROGRAM

## 2024-02-22 PROCEDURE — 99283 EMERGENCY DEPT VISIT LOW MDM: CPT | Mod: 25

## 2024-02-22 PROCEDURE — 99284 EMERGENCY DEPT VISIT MOD MDM: CPT | Mod: ,,, | Performed by: NURSE PRACTITIONER

## 2024-02-22 PROCEDURE — 10061 I&D ABSCESS COMP/MULTIPLE: CPT | Mod: S$PBB,,, | Performed by: STUDENT IN AN ORGANIZED HEALTH CARE EDUCATION/TRAINING PROGRAM

## 2024-02-22 PROCEDURE — 99204 OFFICE O/P NEW MOD 45 MIN: CPT | Mod: 25,S$PBB,, | Performed by: STUDENT IN AN ORGANIZED HEALTH CARE EDUCATION/TRAINING PROGRAM

## 2024-02-22 RX ORDER — CLINDAMYCIN HYDROCHLORIDE 300 MG/1
300 CAPSULE ORAL EVERY 8 HOURS
Qty: 30 CAPSULE | Refills: 0 | Status: SHIPPED | OUTPATIENT
Start: 2024-02-22 | End: 2024-03-03

## 2024-02-22 RX ORDER — HYDROCODONE BITARTRATE AND ACETAMINOPHEN 10; 325 MG/1; MG/1
1 TABLET ORAL EVERY 6 HOURS PRN
Qty: 28 TABLET | Refills: 0 | Status: SHIPPED | OUTPATIENT
Start: 2024-02-22

## 2024-02-22 NOTE — PROGRESS NOTES
History & Physical    SUBJECTIVE:     History of Present Illness:  23-year-old  male presented to the clinic for evaluation for right chest abscess secondary from hidradenitis suppurativa.  Patient has had hidradenitis for many years and has developed chronic draining abscesses and fistulas under both axilla; he has had some recently develop in his right lower chest.  Developed a large fluid collection in the right chest a few days ago in his having extreme pain.  Went to ER and was referred to General surgery Clinic today.  Patient was prescribed clindamycin just today, but has not picked that up yet.  Patient has seen dermatology and was prescribed Humira, but the patient has not been following back up with dermatology; family states they did not want to see Dermatology again.  Denies any chest pain/shortness of breath, nausea/vomiting/diarrhea, fever/chills.    No chief complaint on file.      Review of patient's allergies indicates:   Allergen Reactions    Shellfish containing products Swelling       Current Outpatient Medications   Medication Sig Dispense Refill    clindamycin (CLEOCIN) 300 MG capsule Take 1 capsule (300 mg total) by mouth every 8 (eight) hours. for 10 days 30 capsule 0    HYDROcodone-acetaminophen (NORCO)  mg per tablet Take 1 tablet by mouth every 6 (six) hours as needed for Pain. 28 tablet 0     No current facility-administered medications for this visit.       No past medical history on file.  Past Surgical History:   Procedure Laterality Date    bilateral knee surgery      TONSILLECTOMY       No family history on file.  Social History     Tobacco Use    Smoking status: Every Day     Types: Vaping w/o nicotine    Smokeless tobacco: Never   Substance Use Topics    Alcohol use: Yes     Alcohol/week: 1.0 standard drink of alcohol     Types: 1 Standard drinks or equivalent per week    Drug use: Never        Review of Systems:  Review of Systems   Constitutional: Negative.   Negative for appetite change, chills, fever and unexpected weight change.   HENT: Negative.  Negative for trouble swallowing.    Eyes: Negative.    Respiratory: Negative.  Negative for chest tightness and shortness of breath.    Cardiovascular: Negative.  Negative for chest pain.   Gastrointestinal: Negative.  Negative for abdominal distention, abdominal pain, blood in stool and nausea.   Endocrine: Negative.    Genitourinary: Negative.  Negative for hematuria.   Musculoskeletal: Negative.  Negative for back pain and myalgias.   Skin: Negative.  Negative for color change.   Allergic/Immunologic: Negative.    Neurological: Negative.  Negative for dizziness, syncope, weakness and light-headedness.   Psychiatric/Behavioral: Negative.  Negative for agitation.        OBJECTIVE:     Vital Signs (Most Recent)              Physical Exam:  Physical Exam  Constitutional:       General: He is not in acute distress.     Appearance: Normal appearance.   HENT:      Head: Normocephalic.   Cardiovascular:      Rate and Rhythm: Normal rate.   Pulmonary:      Effort: Pulmonary effort is normal. No respiratory distress.   Abdominal:      General: There is no distension.      Tenderness: There is no abdominal tenderness.   Musculoskeletal:         General: Normal range of motion.        Arms:       Comments: Severe chronic hidradenitis with superficial fistulas/sinus tracts, draining purulent drainage in the right axilla and spreading to the right medial arm.  Hidradenitis to left axilla, no draining areas.      Abscess to the right lower chest just below the inframammary fold; a few other chronic draining fistula/sinus tracts.  Although the axilla and right chest or drainage, patient does not have any tenderness to palpation in the axilla of the right chest sinus tract; only complain of pain at the abscess/fluid collection   Skin:     General: Skin is warm.      Coloration: Skin is not jaundiced.   Neurological:      General: No focal  deficit present.      Mental Status: He is alert and oriented to person, place, and time.      Cranial Nerves: No cranial nerve deficit.         ASSESSMENT/PLAN:     Hidradenitis suppurativa; right chest abscess    PLAN:Plan     To the procedure room for incision and drainage.      We will have the patient follow back up in 2 weeks.  We will refer to Plastic surgery due to the patient will need a very large excision with rotational flaps, possible skin grafting on multiple areas

## 2024-02-22 NOTE — Clinical Note
"Cally Desir" Monisha was seen and treated in our emergency department on 2/22/2024.  He may return to work on 02/23/2024.       If you have any questions or concerns, please don't hesitate to call.      Octavia Meek, SHYLA"

## 2024-02-22 NOTE — ED PROVIDER NOTES
Encounter Date: 2/22/2024       History     Chief Complaint   Patient presents with    Abscess     22 y/o WM with PMHx of Hidradenitis Suppurativa arrived to the ED via POV with complaint of abscess to right lateral chest wall that he first noted 2 days ago and has progressively become worse. Report purulent drainage, swelling and painful. Rates pain 8/10. Denies fever.  Patient is following Dr. Brown. Has received 3 doses of Humira, but has not taken follow up dose.     The history is provided by the patient and a parent.     Review of patient's allergies indicates:   Allergen Reactions    Shellfish containing products Swelling     History reviewed. No pertinent past medical history.  Past Surgical History:   Procedure Laterality Date    bilateral knee surgery      TONSILLECTOMY       History reviewed. No pertinent family history.  Social History     Tobacco Use    Smoking status: Every Day     Types: Vaping w/o nicotine    Smokeless tobacco: Never   Substance Use Topics    Alcohol use: Yes     Alcohol/week: 1.0 standard drink of alcohol     Types: 1 Standard drinks or equivalent per week    Drug use: Never     Review of Systems   Constitutional:  Negative for activity change, appetite change and fever.   HENT: Negative.     Eyes: Negative.    Respiratory: Negative.     Cardiovascular: Negative.    Genitourinary: Negative.    Musculoskeletal: Negative.    Skin:  Positive for wound (abscess).   Neurological: Negative.    Hematological: Negative.    Psychiatric/Behavioral: Negative.         Physical Exam     Initial Vitals [02/22/24 0615]   BP Pulse Resp Temp SpO2   127/80 83 16 97.9 °F (36.6 °C) 97 %      MAP       --         Physical Exam    Nursing note and vitals reviewed.  Constitutional: He appears well-developed and well-nourished. He is Obese . He is cooperative. He does not have a sickly appearance. He does not appear ill.   HENT:   Head: Normocephalic and atraumatic.   Mouth/Throat: Mucous membranes are  normal.   Cardiovascular:  Normal rate, regular rhythm and normal heart sounds.           Pulmonary/Chest: Effort normal and breath sounds normal.     Neurological: He is alert and oriented to person, place, and time.   Skin: Skin is warm. Abscess noted.        Psychiatric: He has a normal mood and affect. His speech is normal and behavior is normal. Thought content normal.         Medical Screening Exam   See Full Note    ED Course   Procedures  Labs Reviewed - No data to display       Imaging Results    None          Medications - No data to display  Medical Decision Making  24 y/o WM with PMHx of Hidradenitis Suppurativa arrived to the ED via POV with complaint of abscess to right lateral chest wall that he first noted 2 days ago and has progressively become worse. Report purulent drainage, swelling and painful. Rates pain 8/10. Denies fever.  Patient is following Dr. Brown. Has received 3 doses of Humira, but has not taken follow up dose.     Amount and/or Complexity of Data Reviewed  Discussion of management or test interpretation with external provider(s): Discharge MDM  I discussed physical exam findings with patient and his mother. Due to tracking of abscess due to hidradenitis suppurativa I will refer him to surgeon for evaluation. Secure chat with Dr. Rangel regarding patient. He will see patient as a walk in at 08:00 am this morning in his office.   Applied 4x4 s and secured tape at site of drainage. Patient tolerated well Clindamycin 300 mg every 8 hours (#30/0) sent into pharmacy. Nothing to eat or drink this morning. Patient and his mother agreed to treatment plan and verbalized understanding.  Patient was discharged in stable condition.  Detailed return precautions discussed.                                       Clinical Impression:   Final diagnoses:  [L73.2] Hidradenitis suppurativa (Primary)  [L02.91] Abscess        ED Disposition Condition    Discharge Stable          ED Prescriptions        Medication Sig Dispense Start Date End Date Auth. Provider    clindamycin (CLEOCIN) 300 MG capsule Take 1 capsule (300 mg total) by mouth every 8 (eight) hours. for 10 days 30 capsule 2/22/2024 3/3/2024 Octavia Meek, SHYLA          Follow-up Information       Follow up With Specialties Details Why Contact Info    Rio Rangel, DO General Surgery, Surgery Today Walk in at 08:0 am [address] [facility] [address]  [phone]               Octavia Meek FNP  02/22/24 0764

## 2024-02-22 NOTE — LETTER
February 22, 2024      [facility] - General Surgery  [address]  [place] [address]  Phone: [phone]  Fax: [phone]       Patient: Cally Bearden   YOB: 2000  Date of Visit: 02/22/2024    To Whom It May Concern:    Katelin Bearden  was at [facility] on 02/22/2024. His mother (Ada Agee) accompanied him. Ada may return to work  on 2/23/24 with no restrictions. If you have any questions or concerns, or if I can be of further assistance, please do not hesitate to contact me.    Sincerely,    DO Tanja Benz LPN

## 2024-02-22 NOTE — PROGRESS NOTES
Incision and Drainage of right chest abscess    Pre-operative Diagnosis:  Hidradenitis suppurativa; right chest abscess    Post-operative Diagnosis: same    Locations:  Right chest abscess    Indications:  Right chest abscess    Anesthesia: Lidocaine 1% with epinephrine without added sodium bicarbonate    Procedure Details   The risks, benefits, indications, potential complications, and alternatives were explained to the patient and informed consent obtained.    Patient was taken the procedure room and placed on the procedure table in supine position.  The right chest was prepped and draped in usual sterile fashion.  We localize the skin around the abscess with 1% lidocaine with epinephrine.  We then made a 2 cm cruciate incision with a 11 blade scalpel and entered a pocket of purulent drainage.  We cultured the purulent drainage and sent this to microbiology.  We then irrigated the cavity with Vashe solution and suctioned clear, broke up loculations with suctioned younger.  We then packed the cavity with half-inch iodoform packing and placed sterile dressings and gauze and tape.  Patient tolerated procedure well.    EBL: minimal    Findings:  As above    Condition: Stable    Complications:  None

## 2024-02-22 NOTE — LETTER
February 22, 2024      [facility] - General Surgery  [address]  [facility] [address]  Phone: [phone]  Fax: [phone]       Patient: Cally Bearden   YOB: 2000  Date of Visit: 02/22/2024    To Whom It May Concern:    Katelin Bearden  was at [facility] on 02/22/2024. The patient may return to work/school on 2/26/24 with no restrictions. If you have any questions or concerns, or if I can be of further assistance, please do not hesitate to contact me.    Sincerely,    DO Tanja Benz LPN

## 2024-02-22 NOTE — ED TRIAGE NOTES
Presents with c/o abscess under right breast.  States took the 3 doses of Humira  but has not had a follow up dose.  This came up about 2 days ago.

## 2024-02-23 PROCEDURE — 87077 CULTURE AEROBIC IDENTIFY: CPT | Mod: ,,, | Performed by: CLINICAL MEDICAL LABORATORY

## 2024-02-23 PROCEDURE — 87075 CULTR BACTERIA EXCEPT BLOOD: CPT | Mod: ,,, | Performed by: CLINICAL MEDICAL LABORATORY

## 2024-02-23 PROCEDURE — 87186 SC STD MICRODIL/AGAR DIL: CPT | Mod: ,,, | Performed by: CLINICAL MEDICAL LABORATORY

## 2024-02-23 PROCEDURE — 87070 CULTURE OTHR SPECIMN AEROBIC: CPT | Mod: ,,, | Performed by: CLINICAL MEDICAL LABORATORY

## 2024-02-25 LAB — MICROORGANISM SPEC CULT: ABNORMAL

## 2024-02-27 LAB — BACTERIA SPEC ANAEROBE CULT: NORMAL

## 2024-02-28 DIAGNOSIS — L73.2 HIDRADENITIS SUPPURATIVA: Primary | ICD-10-CM

## 2024-02-28 DIAGNOSIS — L02.91 ABSCESS: ICD-10-CM

## 2024-03-04 ENCOUNTER — OFFICE VISIT (OUTPATIENT)
Dept: SURGERY | Facility: CLINIC | Age: 24
End: 2024-03-04
Payer: COMMERCIAL

## 2024-03-04 DIAGNOSIS — L73.2 HIDRADENITIS SUPPURATIVA: Primary | ICD-10-CM

## 2024-03-04 DIAGNOSIS — L02.91 ABSCESS: ICD-10-CM

## 2024-03-04 PROCEDURE — 99213 OFFICE O/P EST LOW 20 MIN: CPT | Mod: S$PBB,,, | Performed by: STUDENT IN AN ORGANIZED HEALTH CARE EDUCATION/TRAINING PROGRAM

## 2024-03-04 PROCEDURE — 1159F MED LIST DOCD IN RCRD: CPT | Mod: ,,, | Performed by: STUDENT IN AN ORGANIZED HEALTH CARE EDUCATION/TRAINING PROGRAM

## 2024-03-04 PROCEDURE — 99212 OFFICE O/P EST SF 10 MIN: CPT | Mod: PBBFAC | Performed by: STUDENT IN AN ORGANIZED HEALTH CARE EDUCATION/TRAINING PROGRAM

## 2024-03-04 RX ORDER — SULFAMETHOXAZOLE AND TRIMETHOPRIM 800; 160 MG/1; MG/1
1 TABLET ORAL 2 TIMES DAILY
Qty: 28 TABLET | Refills: 0 | Status: SHIPPED | OUTPATIENT
Start: 2024-03-04 | End: 2024-03-18

## 2024-03-04 NOTE — PROGRESS NOTES
History & Physical    SUBJECTIVE:     History of Present Illness:  23-year-old  male presented to the clinic for evaluation for right chest abscess secondary from hidradenitis suppurativa.  Patient has had hidradenitis for many years and has developed chronic draining abscesses and fistulas under both axilla; he has had some recently develop in his right lower chest.  Developed a large fluid collection in the right chest a few days ago in his having extreme pain.  Went to ER and was referred to General surgery Clinic today.  Patient was prescribed clindamycin just today, but has not picked that up yet.  Patient has seen dermatology and was prescribed Humira, but the patient has not been following back up with dermatology; family states they did not want to see Dermatology again.  Denies any chest pain/shortness of breath, nausea/vomiting/diarrhea, fever/chills.    3/4:  Patient presents for re-evaluation.  Almost completed clindamycin.  Still having some drainage coming from the right upper abdomen/flank site from previous incision and drainage.  Still having some soreness, but improved.  Denies any chest pain/shortness of breath, nausea/vomiting/diarrhea, fever/chills.    Chief Complaint   Patient presents with    Follow-up       Review of patient's allergies indicates:   Allergen Reactions    Shellfish containing products Swelling       Current Outpatient Medications   Medication Sig Dispense Refill    HYDROcodone-acetaminophen (NORCO)  mg per tablet Take 1 tablet by mouth every 6 (six) hours as needed for Pain. (Patient not taking: Reported on 3/4/2024) 28 tablet 0     No current facility-administered medications for this visit.       History reviewed. No pertinent past medical history.  Past Surgical History:   Procedure Laterality Date    bilateral knee surgery      TONSILLECTOMY       History reviewed. No pertinent family history.  Social History     Tobacco Use    Smoking status: Every  Day     Types: Vaping w/o nicotine    Smokeless tobacco: Never   Substance Use Topics    Alcohol use: Yes     Alcohol/week: 1.0 standard drink of alcohol     Types: 1 Standard drinks or equivalent per week    Drug use: Never        Review of Systems:  Review of Systems   Constitutional: Negative.  Negative for appetite change, chills, fever and unexpected weight change.   HENT: Negative.  Negative for trouble swallowing.    Eyes: Negative.    Respiratory: Negative.  Negative for chest tightness and shortness of breath.    Cardiovascular: Negative.  Negative for chest pain.   Gastrointestinal: Negative.  Negative for abdominal distention, abdominal pain, blood in stool and nausea.   Endocrine: Negative.    Genitourinary: Negative.  Negative for hematuria.   Musculoskeletal: Negative.  Negative for back pain and myalgias.   Skin: Negative.  Negative for color change.   Allergic/Immunologic: Negative.    Neurological: Negative.  Negative for dizziness, syncope, weakness and light-headedness.   Psychiatric/Behavioral: Negative.  Negative for agitation.        OBJECTIVE:     Vital Signs (Most Recent)              Physical Exam:  Physical Exam  Constitutional:       General: He is not in acute distress.     Appearance: Normal appearance.   HENT:      Head: Normocephalic.   Cardiovascular:      Rate and Rhythm: Normal rate.   Pulmonary:      Effort: Pulmonary effort is normal. No respiratory distress.   Abdominal:      General: There is no distension.      Tenderness: There is no abdominal tenderness.   Musculoskeletal:         General: Normal range of motion.        Arms:       Comments: Severe chronic hidradenitis with superficial fistulas/sinus tracts, draining purulent drainage in the right axilla and spreading to the right medial arm.  Hidradenitis to left axilla, no draining areas.      Minimal fluid to right chest previous incision and drainage site; minimal tenderness to palpation; still some purulent drainage  present   Skin:     General: Skin is warm.      Coloration: Skin is not jaundiced.   Neurological:      General: No focal deficit present.      Mental Status: He is alert and oriented to person, place, and time.      Cranial Nerves: No cranial nerve deficit.         ASSESSMENT/PLAN:     Hidradenitis suppurativa; right chest abscess    PLAN:Plan     Bactrim for 14 days. We will refer to Plastic surgery (a second time) due to the patient will need a very large excision with rotational flaps, possible skin grafting on multiple areas